# Patient Record
Sex: MALE | Race: WHITE | NOT HISPANIC OR LATINO | Employment: OTHER | URBAN - METROPOLITAN AREA
[De-identification: names, ages, dates, MRNs, and addresses within clinical notes are randomized per-mention and may not be internally consistent; named-entity substitution may affect disease eponyms.]

---

## 2020-06-18 ENCOUNTER — PREP FOR SURGERY (OUTPATIENT)
Dept: OTHER | Facility: HOSPITAL | Age: 70
End: 2020-06-18

## 2020-06-18 DIAGNOSIS — M16.11 PRIMARY OSTEOARTHRITIS OF RIGHT HIP: Primary | ICD-10-CM

## 2020-06-18 RX ORDER — OXYCODONE HCL 10 MG/1
10 TABLET, FILM COATED, EXTENDED RELEASE ORAL ONCE
Status: CANCELLED | OUTPATIENT
Start: 2020-10-23 | End: 2020-06-18

## 2020-06-18 RX ORDER — ACETAMINOPHEN 500 MG
1000 TABLET ORAL ONCE
Status: CANCELLED | OUTPATIENT
Start: 2020-10-23 | End: 2020-06-18

## 2020-06-18 RX ORDER — CEFAZOLIN SODIUM 2 G/100ML
2 INJECTION, SOLUTION INTRAVENOUS ONCE
Status: CANCELLED | OUTPATIENT
Start: 2020-10-23 | End: 2020-06-18

## 2020-06-19 NOTE — H&P
DORON MULLEN is a 69 year old male whose main reason for today's visit is pain in the right hip which has been present for 10 months.    He has been referred to me by Dr. Shady East.  He has been having progressively worsening pain.  He has some good days and bad days.  The patient denies an injury.    The patient is on pain meds(MELOXICAM TABLET (MELOXICAM TABS)).  This does give him good relief at times.   The patient states that he is having a sharp pain which he rates at a 8 on a scale from 1-10.  The pain occurs intermittently.  The pain is located in the front of the hip.  The pain occurs on the right side only.  The pain is not radiating.  The patient states that activity and walking makes it worse.   His main difficulty is after a long walk.  He is able to walk up to 2 miles.  He walks with his dog and also his wife.  I mean back from the walk.  He notices more pain.  He also notices progressive difficulty trying to put his shoes and socks on on the RIGHT side and also difficulty at times, getting in and out of the car.  He is very active and likes to swim.  He does not have any difficulty while swimming.  He likes to ski and he noticed some discomfort and pain while trying to do it.  He has also tried some PRP injections and hyaluronic acid in the RIGHT hip.  He did not notice any significant change in symptoms with those injections.  He denies any history of MRSA, DVT.  He denies any cardiac symptoms.  He has a previous history of ruptured diverticulum and sigmoid colon resection.  Review of Systems:  Positive for: Joint Pain.    Patient denies: Abdominal Pain, Bleeding, Chest Pain, Convulsions/Seizure, Decreased Motion, Depression, Difficulty Swallowing, Easy Bruisability, Emotional Disturbances, Eyes or Vision Problems, Fecal Incontinence, Fever/Chills, Headaches, Increased Thirst, Increased Hunger, Insomnia, Nausea/Vomiting, Night Sweats, Poor Balance, Persistent Cough, Rash, Shortness of Breath,  Shortness of Breath While Lying down, Skin Problems, Urinary Retention and Weakness.  Allergies:  Penicillin (critical)  Medications:  meloxicam tablet (meloxicam tabs)   atorvastatin calcium tablet (atorvastatin calcium tabs)   lisinopril tablet (lisinopril tabs)   Patient History of:  PROSTATE  HIGH CHOLESTEROL  BLOOD CLOTS/EMBOLISM - NEGATIVE  HYPERTENSION  CANCER - PROSTATE  Surgical History:  Negative  Known Family History of:  kidney disease-father  Social History:  SEBAS TORRES is a  69 year old male.  He has never used tobacco products.  He has not fallen in the last 12 months.      Past medical, social, family histories and ROS reviewed today with the patient and changes documented in the chart (06/17/2020).  Referring Dr. COBY JARQUIN MD  PCP Dr. RUTH BACON, [facility]    Physical Exam  Height:  67 in.    Weight:  170 lbs.     BMI:  26.72      Gait: antalgic               Ambulation: patient ambulates without assistive devices      Mental/HEENT/Cardio/Skin  The patient's general appearance was well nourished, well hydrated, no acute distress.  Orientation was alert and oritented x 3.  The patient's mood was normal.  A head exam revealed normocephalic/atraumatic.  An eye exam revealed pupils equal.  Pulmonary exam shows normal air exchange, no labored breathing, or shortness of breath.  A skin exam showed normal temperature and color in the area of examination.      Right Hip/Pelvis  Normal:  Neurovascular status is intact.  Sensation in hip is normal.  DP Pulse is 3+.  PT PULSE is 3+.  Capillary Refill is normal.  Reflexes are normal.    ROM  Internal Rotation: <30  External Rotation: <40  Abduction: <45  Adduction: <15  Flexion: <100  Extension: <5    Tenderness  Location: groin  Radiation of Pain: anterior thigh  Pain w/ Palpation: none  Tomás Test: negative  Impingement: negative  Straight Leg Raise: negative    Strength  Quad: normal  Abduction: normal  Adduction: normal  Flexion:  normal  Extension: normal  positive Stinchfield sign.  positive Trendelenburg sign.  Attempted movements of the   hip are painful and restricted    Left Hip/Pelvis          Imaging/Diagnostic Studies  X-rays of the Right Hip [AP;AP pelvis;Frog Lateral] were ordered and reviewed today.      X-rays of the right hip/pelvisX-rays show Acetabular femoral head osteophytes.  osteoarthritis is severe.  Images show bone on bone arthrosis.    Impression  Right hip primary osteoarthritis (HKG20-G15.11)    Plan  Options and alternatives were discussed in detail with the patient.  The patient has reached the point of disability and failed nonoperative management.   The patient is indicated for a RIGHT  total hip replacement .     The likely Risks and benefits of the procedure including but not limited to infection, DVT, pulmonary embolism,  recurrent dislocation, Leg length discrepancy, periprosthetic fractures, possibility of injury to nerves or vessels, tendons, possibility of morbidity and mortality likely  medical risks for stroke and heart attack,  have been discussed in detail.     Despite the risks involved the patient would like to proceed.  The patient  is being scheduled for a RIGHT  Total HIP  arthroplasty  by direct anterior approach   at [facility], [place], operated by [facility] on OCT 23 of 2020.  I will request for Medical and cardiac clearance from Dr. Shavon Merrill MD and   Cardiology.  Postoperative DVT prophylaxis - Patient has no high risk factors  Plan for Aspirin.   Preoperative antibiotic prophylaxis - Plan for SCIP protocol with Cephazolin weight based.   Patient does not use tobacco.      We discussed the benefits of surgical intervention, as well as alternative treatments.  Potential surgical risks and complications include but are not limited to DVT, infection, neurovascular injury, fracture, implant wear, failure, possible need for revision surgery, loss of motion, dislocation, limb length changes.  Sufficient  opportunity was given to discuss the condition and treatment plan with the doctor, and all questions were answered for the patient.  Nonsurgical measures such as injections, medications, or physical therapy may not offer significant relief to this patient.  The discussion lasted 30 minutes.  CHRISTIAN agreed to proceed with the surgery.      Christian should follow up with JAN BECKMAN MD post op.

## 2020-10-14 ENCOUNTER — TRANSCRIBE ORDERS (OUTPATIENT)
Dept: PREADMISSION TESTING | Facility: HOSPITAL | Age: 70
End: 2020-10-14

## 2020-10-14 DIAGNOSIS — Z01.818 OTHER SPECIFIED PRE-OPERATIVE EXAMINATION: Primary | ICD-10-CM

## 2020-10-16 ENCOUNTER — TRANSCRIBE ORDERS (OUTPATIENT)
Dept: LAB | Facility: HOSPITAL | Age: 70
End: 2020-10-16

## 2020-10-16 ENCOUNTER — LAB (OUTPATIENT)
Dept: LAB | Facility: HOSPITAL | Age: 70
End: 2020-10-16

## 2020-10-16 ENCOUNTER — APPOINTMENT (OUTPATIENT)
Dept: PREADMISSION TESTING | Facility: HOSPITAL | Age: 70
End: 2020-10-16

## 2020-10-16 ENCOUNTER — HOSPITAL ENCOUNTER (OUTPATIENT)
Dept: GENERAL RADIOLOGY | Facility: HOSPITAL | Age: 70
Discharge: HOME | End: 2020-10-16

## 2020-10-16 VITALS
HEART RATE: 63 BPM | OXYGEN SATURATION: 99 % | TEMPERATURE: 98 F | SYSTOLIC BLOOD PRESSURE: 144 MMHG | HEIGHT: 70 IN | RESPIRATION RATE: 16 BRPM | DIASTOLIC BLOOD PRESSURE: 85 MMHG | WEIGHT: 176 LBS | BODY MASS INDEX: 25.2 KG/M2

## 2020-10-16 DIAGNOSIS — I10 ESSENTIAL HYPERTENSION, MALIGNANT: ICD-10-CM

## 2020-10-16 DIAGNOSIS — M16.11 PRIMARY OSTEOARTHRITIS OF RIGHT HIP: ICD-10-CM

## 2020-10-16 DIAGNOSIS — E78.5 HYPERLIPIDEMIA, UNSPECIFIED HYPERLIPIDEMIA TYPE: Primary | ICD-10-CM

## 2020-10-16 DIAGNOSIS — Z85.46 PERSONAL HISTORY OF MALIGNANT NEOPLASM OF PROSTATE: ICD-10-CM

## 2020-10-16 DIAGNOSIS — E78.5 HYPERLIPIDEMIA, UNSPECIFIED HYPERLIPIDEMIA TYPE: ICD-10-CM

## 2020-10-16 LAB
ALBUMIN SERPL-MCNC: 4.3 G/DL (ref 3.5–5.2)
ALBUMIN/GLOB SERPL: 1.5 G/DL
ALP SERPL-CCNC: 58 U/L (ref 39–117)
ALT SERPL W P-5'-P-CCNC: 22 U/L (ref 1–41)
ANION GAP SERPL CALCULATED.3IONS-SCNC: 6.6 MMOL/L (ref 5–15)
APTT PPP: 30.8 SECONDS (ref 22.7–35.4)
AST SERPL-CCNC: 19 U/L (ref 1–40)
BASOPHILS # BLD AUTO: 0.04 10*3/MM3 (ref 0–0.2)
BASOPHILS NFR BLD AUTO: 0.6 % (ref 0–1.5)
BILIRUB SERPL-MCNC: 0.6 MG/DL (ref 0–1.2)
BUN SERPL-MCNC: 26 MG/DL (ref 8–23)
BUN/CREAT SERPL: 21.8 (ref 7–25)
CALCIUM SPEC-SCNC: 9.9 MG/DL (ref 8.6–10.5)
CHLORIDE SERPL-SCNC: 103 MMOL/L (ref 98–107)
CHOLEST SERPL-MCNC: 161 MG/DL (ref 0–200)
CK SERPL-CCNC: 65 U/L (ref 20–200)
CO2 SERPL-SCNC: 23.4 MMOL/L (ref 22–29)
CREAT SERPL-MCNC: 1.19 MG/DL (ref 0.76–1.27)
DEPRECATED RDW RBC AUTO: 41.7 FL (ref 37–54)
EOSINOPHIL # BLD AUTO: 0.14 10*3/MM3 (ref 0–0.4)
EOSINOPHIL NFR BLD AUTO: 2.2 % (ref 0.3–6.2)
ERYTHROCYTE [DISTWIDTH] IN BLOOD BY AUTOMATED COUNT: 12.7 % (ref 12.3–15.4)
GFR SERPL CREATININE-BSD FRML MDRD: 60 ML/MIN/1.73
GLOBULIN UR ELPH-MCNC: 2.9 GM/DL
GLUCOSE SERPL-MCNC: 102 MG/DL (ref 65–99)
HBA1C MFR BLD: 5.79 % (ref 4.8–5.6)
HCT VFR BLD AUTO: 47.2 % (ref 37.5–51)
HDLC SERPL-MCNC: 43 MG/DL (ref 40–60)
HGB BLD-MCNC: 15.7 G/DL (ref 13–17.7)
IMM GRANULOCYTES # BLD AUTO: 0.04 10*3/MM3 (ref 0–0.05)
IMM GRANULOCYTES NFR BLD AUTO: 0.6 % (ref 0–0.5)
INR PPP: 1.07 (ref 0.9–1.1)
LDLC SERPL CALC-MCNC: 99 MG/DL (ref 0–100)
LDLC/HDLC SERPL: 2.27 {RATIO}
LYMPHOCYTES # BLD AUTO: 1.58 10*3/MM3 (ref 0.7–3.1)
LYMPHOCYTES NFR BLD AUTO: 24.8 % (ref 19.6–45.3)
MCH RBC QN AUTO: 30 PG (ref 26.6–33)
MCHC RBC AUTO-ENTMCNC: 33.3 G/DL (ref 31.5–35.7)
MCV RBC AUTO: 90.2 FL (ref 79–97)
MONOCYTES # BLD AUTO: 0.61 10*3/MM3 (ref 0.1–0.9)
MONOCYTES NFR BLD AUTO: 9.6 % (ref 5–12)
NEUTROPHILS NFR BLD AUTO: 3.97 10*3/MM3 (ref 1.7–7)
NEUTROPHILS NFR BLD AUTO: 62.2 % (ref 42.7–76)
NRBC BLD AUTO-RTO: 0 /100 WBC (ref 0–0.2)
PLATELET # BLD AUTO: 268 10*3/MM3 (ref 140–450)
PMV BLD AUTO: 10 FL (ref 6–12)
POTASSIUM SERPL-SCNC: 4.8 MMOL/L (ref 3.5–5.2)
PROT SERPL-MCNC: 7.2 G/DL (ref 6–8.5)
PROTHROMBIN TIME: 13.8 SECONDS (ref 11.7–14.2)
PSA SERPL-MCNC: <0.014 NG/ML (ref 0–4)
RBC # BLD AUTO: 5.23 10*6/MM3 (ref 4.14–5.8)
SODIUM SERPL-SCNC: 133 MMOL/L (ref 136–145)
TRIGL SERPL-MCNC: 103 MG/DL (ref 0–150)
VLDLC SERPL-MCNC: 19 MG/DL (ref 5–40)
WBC # BLD AUTO: 6.38 10*3/MM3 (ref 3.4–10.8)

## 2020-10-16 PROCEDURE — 71046 X-RAY EXAM CHEST 2 VIEWS: CPT

## 2020-10-16 PROCEDURE — 85025 COMPLETE CBC W/AUTO DIFF WBC: CPT | Performed by: ORTHOPAEDIC SURGERY

## 2020-10-16 PROCEDURE — 36415 COLL VENOUS BLD VENIPUNCTURE: CPT

## 2020-10-16 PROCEDURE — 73502 X-RAY EXAM HIP UNI 2-3 VIEWS: CPT

## 2020-10-16 PROCEDURE — 80061 LIPID PANEL: CPT

## 2020-10-16 PROCEDURE — 63710000001 MUPIROCIN 2 % OINTMENT: Performed by: ORTHOPAEDIC SURGERY

## 2020-10-16 PROCEDURE — 82550 ASSAY OF CK (CPK): CPT

## 2020-10-16 PROCEDURE — A9270 NON-COVERED ITEM OR SERVICE: HCPCS | Performed by: ORTHOPAEDIC SURGERY

## 2020-10-16 PROCEDURE — 83036 HEMOGLOBIN GLYCOSYLATED A1C: CPT | Performed by: ORTHOPAEDIC SURGERY

## 2020-10-16 PROCEDURE — 84153 ASSAY OF PSA TOTAL: CPT

## 2020-10-16 PROCEDURE — 85610 PROTHROMBIN TIME: CPT | Performed by: ORTHOPAEDIC SURGERY

## 2020-10-16 PROCEDURE — 85730 THROMBOPLASTIN TIME PARTIAL: CPT | Performed by: ORTHOPAEDIC SURGERY

## 2020-10-16 PROCEDURE — 80053 COMPREHEN METABOLIC PANEL: CPT | Performed by: ORTHOPAEDIC SURGERY

## 2020-10-16 RX ORDER — ACETAMINOPHEN 500 MG
1000 TABLET ORAL EVERY 6 HOURS PRN
COMMUNITY

## 2020-10-16 RX ORDER — LORATADINE 10 MG/1
10 TABLET ORAL DAILY
COMMUNITY

## 2020-10-16 RX ORDER — LISINOPRIL 20 MG/1
20 TABLET ORAL 2 TIMES DAILY
COMMUNITY

## 2020-10-16 RX ORDER — MELOXICAM 15 MG/1
15 TABLET ORAL DAILY
COMMUNITY

## 2020-10-16 RX ORDER — ATORVASTATIN CALCIUM 10 MG/1
10 TABLET, FILM COATED ORAL DAILY
COMMUNITY

## 2020-10-16 NOTE — DISCHARGE INSTRUCTIONS
Take the following medications the morning of surgery:      TYLENOL, LORATIDINE    ARRIVE TO MAIN SURGERY AT 5:15 AM ON 10/23/2020    If you are on prescription narcotic pain medication to control your pain you may also take that medication the morning of surgery.    General Instructions:  • Do not eat solid food after midnight the night before surgery.  • You may drink clear liquids day of surgery but must stop at least one hour before your hospital arrival time.  • It is beneficial for you to have a clear drink that contains carbohydrates the day of surgery.  We suggest a 12 to 20 ounce bottle of Gatorade or Powerade for non-diabetic patients or a 12 to 20 ounce bottle of G2 or Powerade Zero for diabetic patients. (Pediatric patients, are not advised to drink a 12 to 20 ounce carbohydrate drink)    Clear liquids are liquids you can see through.  Nothing red in color.     Plain water                               Sports drinks  Sodas                                   Gelatin (Jell-O)  Fruit juices without pulp such as white grape juice and apple juice  Popsicles that contain no fruit or yogurt  Tea or coffee (no cream or milk added)  Gatorade / Powerade  G2 / Powerade Zero    • Infants may have breast milk up to four hours before surgery.  • Infants drinking formula may drink formula up to six hours before surgery.   • Patients who avoid smoking, chewing tobacco and alcohol for 4 weeks prior to surgery have a reduced risk of post-operative complications.  Quit smoking as many days before surgery as you can.  • Do not smoke, use chewing tobacco or drink alcohol the day of surgery.   • If applicable bring your C-PAP/ BI-PAP machine.  • Bring any papers given to you in the doctor’s office.  • Wear clean comfortable clothes.  • Do not wear contact lenses, false eyelashes or make-up.  Bring a case for your glasses.   • Bring crutches or walker if applicable.  • Remove all piercings.  Leave jewelry and any other  valuables at home.  • Hair extensions with metal clips must be removed prior to surgery.  • The Pre-Admission Testing nurse will instruct you to bring medications if unable to obtain an accurate list in Pre-Admission Testing.        If you were given a blood bank ID arm band remember to bring it with you the day of surgery.    Preventing a Surgical Site Infection:  • For 2 to 3 days before surgery, avoid shaving with a razor because the razor can irritate skin and make it easier to develop an infection.    • Any areas of open skin can increase the risk of a post-operative wound infection by allowing bacteria to enter and travel throughout the body.  Notify your surgeon if you have any skin wounds / rashes even if it is not near the expected surgical site.  The area will need assessed to determine if surgery should be delayed until it is healed.  • The night prior to surgery shower using a fresh bar of anti-bacterial soap (such as Dial) and clean washcloth.  Sleep in a clean bed with clean clothing.  Do not allow pets to sleep with you.  • Shower on the morning of surgery using a fresh bar of anti-bacterial soap (such as Dial) and clean washcloth.  Dry with a clean towel and dress in clean clothing.  • Ask your surgeon if you will be receiving antibiotics prior to surgery.  • Make sure you, your family, and all healthcare providers clean their hands with soap and water or an alcohol based hand  before caring for you or your wound.    Day of surgery:  Your arrival time is approximately two hours before your scheduled surgery time.  Upon arrival, a Pre-op nurse and Anesthesiologist will review your health history, obtain vital signs, and answer questions you may have.  The only belongings needed at this time will be a list of your home medications and if applicable your C-PAP/BI-PAP machine.  If you are staying overnight your family can leave the rest of your belongings in the car and bring them to your room  later.  A Pre-op nurse will start an IV and you may receive medication in preparation for surgery, including something to help you relax.  While you are in surgery your family should notify the waiting room  if they leave the waiting room area and provide a contact phone number.    Please be aware that surgery does come with discomfort.  We want to make every effort to control your discomfort so please discuss any uncontrolled symptoms with your nurse.   Your doctor will most likely have prescribed pain medications.      If you are going home after surgery you will receive individualized written care instructions before being discharged.  A responsible adult must drive you to and from the hospital on the day of your surgery and stay with you for 24 hours.    If you are staying overnight following surgery, you will be transported to your hospital room following the recovery period.  [facility] has all private rooms.    If you have any questions please call Pre-Admission Testing at [phone].  Deductibles and co-payments are collected on the day of service. Please be prepared to pay the required co-pay, deductible or deposit on the day of service as defined by your plan.    Patient Education for Self-Quarantine Process    Following your COVID testing, we strongly recommend that you do not leave your home after you have been tested for COVID except to get medical care. This includes not going to work, school or to public areas.  If this is not possible for you to do please limit your activities to only required outings.  Be sure to wear a mask when you are with other people, practice social distancing and wash your hands frequently.      The following items provide additional details to keep you safe.  • Wash your hands with soap and water frequently for at least 20 seconds.   • Avoid touching your eyes, nose and mouth with unwashed hands.  • Do not share anything - utensils, towels, food  from the same bowl.   • Have your own utensils, drinking glass, dishes, towels and bedding.   • Do not have visitors.   • Do use FaceTime to stay in touch with family and friends.  • You should stay in a specific room away from others if possible.   • Stay at least 6 feet away from others in the home if you cannot have a dedicated room to yourself.   • Do not snuggle with your pet. While the CDC says there is no evidence that pets can spread COVID-19 or be infected from humans, it is probably best to avoid “petting, snuggling, being kissed or licked and sharing food (during self-quarantine)”, according to the CDC.   • Sanitize household surfaces daily. Include all high touch areas (door handles, light switches, phones, countertops, etc.)  • Do not share a bathroom with others, if possible.   • Wear a mask around others in your home if you are unable to stay in a separate room or 6 feet apart. If  you are unable to wear a mask, have your family member wear a mask if they must be within 6 feet of you.   Call your surgeon immediately if you experience any of the following symptoms:  • Sore Throat  • Shortness of Breath or difficulty breathing  • Cough  • Chills  • Body soreness or muscle pain  • Headache  • Fever  • New loss of taste or smell  • Do not arrive for your surgery ill.  Your procedure will need to be rescheduled to another time.  You will need to call your physician before the day of surgery to avoid any unnecessary exposure to hospital staff as well as other patients.    CHLORHEXIDINE CLOTH INSTRUCTIONS  The morning of surgery follow these instructions using the Chlorhexidine cloths you've been given.  These steps reduce bacteria on the body.  Do not use the cloths near your eyes, ears mouth, genitalia or on open wounds.  Throw the cloths away after use but do not try to flush them down a toilet.      • Open and remove one cloth at a time from the package.    • Leave the cloth unfolded and begin the  bathing.  • Massage the skin with the cloths using gentle pressure to remove bacteria.  Do not scrub harshly.   • Follow the steps below with one 2% CHG cloth per area (6 total cloths).  • One cloth for neck, shoulders and chest.  • One cloth for both arms, hands, fingers and underarms (do underarms last).  • One cloth for the abdomen followed by groin.  • One cloth for right leg and foot including between the toes.  • One cloth for left leg and foot including between the toes.  • The last cloth is to be used for the back of the neck, back and buttocks.    Allow the CHG to air dry 3 minutes on the skin which will give it time to work and decrease the chance of irritation.  The skin may feel sticky until it is dry.  Do not rinse with water or any other liquid or you will lose the beneficial effects of the CHG.  If mild skin irritation occurs, do rinse the skin to remove the CHG.  Report this to the nurse at time of admission.  Do not apply lotions, creams, ointments, deodorants or perfumes after using the clothes. Dress in clean clothes before coming to the hospital.    BACTROBAN NASAL OINTMENT  There are many germs normally in your nose. Bactroban is an ointment that will help reduce these germs. Please follow these instructions for Bactroban use:      _1___The day before surgery in the morning  Date__10/22/2020______    _2___The day before surgery in the evening              Date__10/22/2020______    __3__The day of surgery in the morning    Date__10/23/2020______    **Squirt ½ package of Bactroban Ointment onto a cotton applicator and apply to inside of 1st nostril.  Squirt the remaining Bactroban and apply to the inside of the other nostril.

## 2020-10-20 NOTE — H&P
DORON MULLEN is a 69 year old male whose main reason for today's visit is pain in the right hip which has been present for 10 months.    He has been referred to me by Dr. Shady East.  He has been having progressively worsening pain.  He has some good days and bad days.  The patient denies an injury.    The patient is on pain meds(MELOXICAM TABLET (MELOXICAM TABS)).  This does give him good relief at times.   The patient states that he is having a sharp pain which he rates at a 8 on a scale from 1-10.  The pain occurs intermittently.  The pain is located in the front of the hip.  The pain occurs on the right side only.  The pain is not radiating.  The patient states that activity and walking makes it worse.   His main difficulty is after a long walk.  He is able to walk up to 2 miles.  He walks with his dog and also his wife.  I mean back from the walk.  He notices more pain.  He also notices progressive difficulty trying to put his shoes and socks on on the RIGHT side and also difficulty at times, getting in and out of the car.  He is very active and likes to swim.  He does not have any difficulty while swimming.  He likes to ski and he noticed some discomfort and pain while trying to do it.  He has also tried some PRP injections and hyaluronic acid in the RIGHT hip.  He did not notice any significant change in symptoms with those injections.  He denies any history of MRSA, DVT.  He denies any cardiac symptoms.  He has a previous history of ruptured diverticulum and sigmoid colon resection.  Review of Systems:  Positive for: Joint Pain.     Patient denies: Abdominal Pain, Bleeding, Chest Pain, Convulsions/Seizure, Decreased Motion, Depression, Difficulty Swallowing, Easy Bruisability, Emotional Disturbances, Eyes or Vision Problems, Fecal Incontinence, Fever/Chills, Headaches, Increased Thirst, Increased Hunger, Insomnia, Nausea/Vomiting, Night Sweats, Poor Balance, Persistent Cough, Rash, Shortness of Breath,  Shortness of Breath While Lying down, Skin Problems, Urinary Retention and Weakness.  Allergies:  Penicillin (critical)  Medications:  meloxicam tablet (meloxicam tabs)   atorvastatin calcium tablet (atorvastatin calcium tabs)   lisinopril tablet (lisinopril tabs)   Patient History of:  PROSTATE  HIGH CHOLESTEROL  BLOOD CLOTS/EMBOLISM - NEGATIVE  HYPERTENSION  CANCER - PROSTATE  Surgical History:  Negative  Known Family History of:  kidney disease-father  Social History:  SEBAS TORRES is a  69 year old male.  He has never used tobacco products.  He has not fallen in the last 12 months.       Past medical, social, family histories and ROS reviewed today with the patient and changes documented in the chart (06/17/2020).  Referring Dr. COBY JARQUIN MD  PCP Dr. RUTH BACON, [facility]     Physical Exam  Height:  67 in.    Weight:  170 lbs.     BMI:  26.72       Gait: antalgic               Ambulation: patient ambulates without assistive devices        Mental/HEENT/Cardio/Skin  The patient's general appearance was well nourished, well hydrated, no acute distress.  Orientation was alert and oritented x 3.  The patient's mood was normal.  A head exam revealed normocephalic/atraumatic.  An eye exam revealed pupils equal.  Pulmonary exam shows normal air exchange, no labored breathing, or shortness of breath.  A skin exam showed normal temperature and color in the area of examination.       Right Hip/Pelvis  Normal:  Neurovascular status is intact.  Sensation in hip is normal.  DP Pulse is 3+.  PT PULSE is 3+.  Capillary Refill is normal.  Reflexes are normal.    ROM  Internal Rotation: <30  External Rotation: <40  Abduction: <45  Adduction: <15  Flexion: <100  Extension: <5     Tenderness  Location: groin  Radiation of Pain: anterior thigh  Pain w/ Palpation: none  Tomás Test: negative  Impingement: negative  Straight Leg Raise: negative     Strength  Quad: normal  Abduction: normal  Adduction: normal  Flexion:  normal  Extension: normal  positive Stinchfield sign.  positive Trendelenburg sign.  Attempted movements of the   hip are painful and restricted     Left Hip/Pelvis            Imaging/Diagnostic Studies  X-rays of the Right Hip [AP;AP pelvis;Frog Lateral] were ordered and reviewed today.       X-rays of the right hip/pelvisX-rays show Acetabular femoral head osteophytes.  osteoarthritis is severe.  Images show bone on bone arthrosis.    Impression  Right hip primary osteoarthritis ([id number]-Y04.11)     Plan  Options and alternatives were discussed in detail with the patient.  The patient has reached the point of disability and failed nonoperative management.   The patient is indicated for a RIGHT  total hip replacement .     The likely Risks and benefits of the procedure including but not limited to infection, DVT, pulmonary embolism,  recurrent dislocation, Leg length discrepancy, periprosthetic fractures, possibility of injury to nerves or vessels, tendons, possibility of morbidity and mortality likely  medical risks for stroke and heart attack,  have been discussed in detail.     Despite the risks involved the patient would like to proceed.  The patient  is being scheduled for a RIGHT  Total HIP  arthroplasty  by direct anterior approach   at [facility] on OCT 23 of 2020.  I will request for Medical and cardiac clearance from Dr. Shavon Merrill MD and   Cardiology.  Postoperative DVT prophylaxis - Patient has no high risk factors  Plan for Aspirin.   Preoperative antibiotic prophylaxis - Plan for SCIP protocol with Cephazolin weight based.   Patient does not use tobacco.       We discussed the benefits of surgical intervention, as well as alternative treatments.  Potential surgical risks and complications include but are not limited to DVT, infection, neurovascular injury, fracture, implant wear, failure, possible need for revision surgery, loss of motion, dislocation, limb length changes.  Sufficient  opportunity was given to discuss the condition and treatment plan with the doctor, and all questions were answered for the patient.  Nonsurgical measures such as injections, medications, or physical therapy may not offer significant relief to this patient.  The discussion lasted 30 minutes.  CHRISTIAN agreed to proceed with the surgery.       Christian should follow up with JAN BECKMAN MD post op.

## 2020-10-21 ENCOUNTER — LAB (OUTPATIENT)
Dept: LAB | Facility: HOSPITAL | Age: 70
End: 2020-10-21

## 2020-10-21 DIAGNOSIS — Z01.818 OTHER SPECIFIED PRE-OPERATIVE EXAMINATION: ICD-10-CM

## 2020-10-21 PROCEDURE — U0004 COV-19 TEST NON-CDC HGH THRU: HCPCS | Performed by: INTERNAL MEDICINE

## 2020-10-21 PROCEDURE — C9803 HOPD COVID-19 SPEC COLLECT: HCPCS

## 2020-10-22 LAB — SARS-COV-2 RNA RESP QL NAA+PROBE: NOT DETECTED

## 2020-10-23 ENCOUNTER — APPOINTMENT (OUTPATIENT)
Dept: GENERAL RADIOLOGY | Facility: HOSPITAL | Age: 70
End: 2020-10-23

## 2020-10-23 ENCOUNTER — ANESTHESIA (OUTPATIENT)
Dept: PERIOP | Facility: HOSPITAL | Age: 70
End: 2020-10-23

## 2020-10-23 ENCOUNTER — ANESTHESIA EVENT (OUTPATIENT)
Dept: PERIOP | Facility: HOSPITAL | Age: 70
End: 2020-10-23

## 2020-10-23 ENCOUNTER — HOSPITAL ENCOUNTER (INPATIENT)
Facility: HOSPITAL | Age: 70
LOS: 1 days | Discharge: HOME HEALTH | End: 2020-10-24
Attending: ORTHOPAEDIC SURGERY | Admitting: ORTHOPAEDIC SURGERY

## 2020-10-23 DIAGNOSIS — M16.11 PRIMARY OSTEOARTHRITIS OF RIGHT HIP: Primary | ICD-10-CM

## 2020-10-23 DIAGNOSIS — Z96.641 STATUS POST RIGHT HIP REPLACEMENT: ICD-10-CM

## 2020-10-23 PROCEDURE — 25010000002 ROPIVACAINE PER 1 MG: Performed by: ORTHOPAEDIC SURGERY

## 2020-10-23 PROCEDURE — 25010000002 CLONIDINE PER 1 MG: Performed by: ORTHOPAEDIC SURGERY

## 2020-10-23 PROCEDURE — 25010000003 CEFAZOLIN IN DEXTROSE 2-4 GM/100ML-% SOLUTION: Performed by: ORTHOPAEDIC SURGERY

## 2020-10-23 PROCEDURE — 25010000002 PHENYLEPHRINE PER 1 ML: Performed by: NURSE ANESTHETIST, CERTIFIED REGISTERED

## 2020-10-23 PROCEDURE — C1776 JOINT DEVICE (IMPLANTABLE): HCPCS | Performed by: ORTHOPAEDIC SURGERY

## 2020-10-23 PROCEDURE — 25010000002 FENTANYL CITRATE (PF) 100 MCG/2ML SOLUTION: Performed by: NURSE ANESTHETIST, CERTIFIED REGISTERED

## 2020-10-23 PROCEDURE — 73501 X-RAY EXAM HIP UNI 1 VIEW: CPT

## 2020-10-23 PROCEDURE — 25010000002 HYDROMORPHONE PER 4 MG: Performed by: NURSE ANESTHETIST, CERTIFIED REGISTERED

## 2020-10-23 PROCEDURE — 25010000002 ONDANSETRON PER 1 MG: Performed by: NURSE ANESTHETIST, CERTIFIED REGISTERED

## 2020-10-23 PROCEDURE — 0SR904A REPLACEMENT OF RIGHT HIP JOINT WITH CERAMIC ON POLYETHYLENE SYNTHETIC SUBSTITUTE, UNCEMENTED, OPEN APPROACH: ICD-10-PCS | Performed by: ORTHOPAEDIC SURGERY

## 2020-10-23 PROCEDURE — 25010000002 NEOSTIGMINE PER 0.5 MG: Performed by: NURSE ANESTHETIST, CERTIFIED REGISTERED

## 2020-10-23 PROCEDURE — 25010000002 KETOROLAC TROMETHAMINE PER 15 MG: Performed by: ORTHOPAEDIC SURGERY

## 2020-10-23 PROCEDURE — 76000 FLUOROSCOPY <1 HR PHYS/QHP: CPT

## 2020-10-23 PROCEDURE — 25010000002 PROPOFOL 10 MG/ML EMULSION: Performed by: NURSE ANESTHETIST, CERTIFIED REGISTERED

## 2020-10-23 PROCEDURE — 25010000002 MIDAZOLAM PER 1 MG: Performed by: ANESTHESIOLOGY

## 2020-10-23 PROCEDURE — 97110 THERAPEUTIC EXERCISES: CPT

## 2020-10-23 PROCEDURE — 25010000002 DEXAMETHASONE PER 1 MG: Performed by: NURSE ANESTHETIST, CERTIFIED REGISTERED

## 2020-10-23 PROCEDURE — 97161 PT EVAL LOW COMPLEX 20 MIN: CPT

## 2020-10-23 DEVICE — ACTIS DUOFIX HIP PROSTHESIS (FEMORAL STEM 12/14 TAPER CEMENTLESS SIZE 8 HIGH COLLAR)  CE
Type: IMPLANTABLE DEVICE | Site: HIP | Status: FUNCTIONAL
Brand: ACTIS

## 2020-10-23 DEVICE — PINNACLE POROCOAT ACETABULAR SHELL SECTOR II 52MM OD
Type: IMPLANTABLE DEVICE | Site: HIP | Status: FUNCTIONAL
Brand: PINNACLE POROCOAT

## 2020-10-23 DEVICE — BIOLOX DELTA CERAMIC FEMORAL HEAD +1.5 36MM DIA 12/14 TAPER
Type: IMPLANTABLE DEVICE | Site: HIP | Status: FUNCTIONAL
Brand: BIOLOX DELTA

## 2020-10-23 DEVICE — KNOTLESS TISSUE CONTROL DEVICE, VIOLET UNIDIRECTIONAL (ANTIBACTERIAL) SYNTHETIC ABSORBABLE DEVICE
Type: IMPLANTABLE DEVICE | Site: HIP | Status: FUNCTIONAL
Brand: STRATAFIX

## 2020-10-23 DEVICE — PINNACLE HIP SOLUTIONS ALTRX POLYETHYLENE ACETABULAR LINER NEUTRAL 36MM ID 52MM OD
Type: IMPLANTABLE DEVICE | Site: HIP | Status: FUNCTIONAL
Brand: PINNACLE ALTRX

## 2020-10-23 DEVICE — TOTL HIP COP DEPUY 9641334: Type: IMPLANTABLE DEVICE | Site: HIP | Status: FUNCTIONAL

## 2020-10-23 DEVICE — SUT FW #2 W/TPR NDL 1/2 CIR 38IN 97CM 26.5MM BLU: Type: IMPLANTABLE DEVICE | Site: HIP | Status: FUNCTIONAL

## 2020-10-23 RX ORDER — UREA 10 %
1 LOTION (ML) TOPICAL NIGHTLY PRN
Status: DISCONTINUED | OUTPATIENT
Start: 2020-10-23 | End: 2020-10-24 | Stop reason: HOSPADM

## 2020-10-23 RX ORDER — ONDANSETRON 4 MG/1
4 TABLET, FILM COATED ORAL EVERY 6 HOURS PRN
Status: DISCONTINUED | OUTPATIENT
Start: 2020-10-23 | End: 2020-10-24 | Stop reason: HOSPADM

## 2020-10-23 RX ORDER — KETAMINE HYDROCHLORIDE 10 MG/ML
INJECTION INTRAMUSCULAR; INTRAVENOUS AS NEEDED
Status: DISCONTINUED | OUTPATIENT
Start: 2020-10-23 | End: 2020-10-23 | Stop reason: SURG

## 2020-10-23 RX ORDER — CEFAZOLIN SODIUM 2 G/100ML
2 INJECTION, SOLUTION INTRAVENOUS EVERY 8 HOURS
Status: COMPLETED | OUTPATIENT
Start: 2020-10-23 | End: 2020-10-24

## 2020-10-23 RX ORDER — BISACODYL 5 MG/1
10 TABLET, DELAYED RELEASE ORAL DAILY PRN
Status: DISCONTINUED | OUTPATIENT
Start: 2020-10-24 | End: 2020-10-24 | Stop reason: HOSPADM

## 2020-10-23 RX ORDER — NALOXONE HCL 0.4 MG/ML
0.1 VIAL (ML) INJECTION
Status: DISCONTINUED | OUTPATIENT
Start: 2020-10-23 | End: 2020-10-24 | Stop reason: HOSPADM

## 2020-10-23 RX ORDER — FENTANYL CITRATE 50 UG/ML
50 INJECTION, SOLUTION INTRAMUSCULAR; INTRAVENOUS
Status: DISCONTINUED | OUTPATIENT
Start: 2020-10-23 | End: 2020-10-23 | Stop reason: HOSPADM

## 2020-10-23 RX ORDER — ONDANSETRON 2 MG/ML
4 INJECTION INTRAMUSCULAR; INTRAVENOUS ONCE AS NEEDED
Status: DISCONTINUED | OUTPATIENT
Start: 2020-10-23 | End: 2020-10-23 | Stop reason: HOSPADM

## 2020-10-23 RX ORDER — HYDROCODONE BITARTRATE AND ACETAMINOPHEN 7.5; 325 MG/1; MG/1
1 TABLET ORAL ONCE AS NEEDED
Status: DISCONTINUED | OUTPATIENT
Start: 2020-10-23 | End: 2020-10-23 | Stop reason: HOSPADM

## 2020-10-23 RX ORDER — ACETAMINOPHEN 500 MG
1000 TABLET ORAL EVERY 8 HOURS
Status: DISCONTINUED | OUTPATIENT
Start: 2020-10-23 | End: 2020-10-23

## 2020-10-23 RX ORDER — DOCUSATE SODIUM 100 MG/1
100 CAPSULE, LIQUID FILLED ORAL 2 TIMES DAILY
Status: DISCONTINUED | OUTPATIENT
Start: 2020-10-23 | End: 2020-10-24 | Stop reason: HOSPADM

## 2020-10-23 RX ORDER — PROPOFOL 10 MG/ML
VIAL (ML) INTRAVENOUS AS NEEDED
Status: DISCONTINUED | OUTPATIENT
Start: 2020-10-23 | End: 2020-10-23 | Stop reason: SURG

## 2020-10-23 RX ORDER — MELOXICAM 15 MG/1
15 TABLET ORAL DAILY
Status: DISCONTINUED | OUTPATIENT
Start: 2020-10-23 | End: 2020-10-23

## 2020-10-23 RX ORDER — SODIUM CHLORIDE 9 MG/ML
75 INJECTION, SOLUTION INTRAVENOUS CONTINUOUS
Status: DISCONTINUED | OUTPATIENT
Start: 2020-10-23 | End: 2020-10-24

## 2020-10-23 RX ORDER — LABETALOL HYDROCHLORIDE 5 MG/ML
5 INJECTION, SOLUTION INTRAVENOUS
Status: DISCONTINUED | OUTPATIENT
Start: 2020-10-23 | End: 2020-10-23 | Stop reason: HOSPADM

## 2020-10-23 RX ORDER — SODIUM CHLORIDE 0.9 % (FLUSH) 0.9 %
10 SYRINGE (ML) INJECTION AS NEEDED
Status: DISCONTINUED | OUTPATIENT
Start: 2020-10-23 | End: 2020-10-23 | Stop reason: HOSPADM

## 2020-10-23 RX ORDER — SODIUM CHLORIDE 0.9 % (FLUSH) 0.9 %
10 SYRINGE (ML) INJECTION EVERY 12 HOURS SCHEDULED
Status: DISCONTINUED | OUTPATIENT
Start: 2020-10-23 | End: 2020-10-23 | Stop reason: HOSPADM

## 2020-10-23 RX ORDER — ONDANSETRON 2 MG/ML
INJECTION INTRAMUSCULAR; INTRAVENOUS AS NEEDED
Status: DISCONTINUED | OUTPATIENT
Start: 2020-10-23 | End: 2020-10-23 | Stop reason: SURG

## 2020-10-23 RX ORDER — FAMOTIDINE 10 MG/ML
20 INJECTION, SOLUTION INTRAVENOUS ONCE
Status: COMPLETED | OUTPATIENT
Start: 2020-10-23 | End: 2020-10-23

## 2020-10-23 RX ORDER — ACETAMINOPHEN 500 MG
1000 TABLET ORAL ONCE
Status: COMPLETED | OUTPATIENT
Start: 2020-10-23 | End: 2020-10-23

## 2020-10-23 RX ORDER — PANTOPRAZOLE SODIUM 40 MG/1
40 TABLET, DELAYED RELEASE ORAL
Status: DISCONTINUED | OUTPATIENT
Start: 2020-10-24 | End: 2020-10-24 | Stop reason: HOSPADM

## 2020-10-23 RX ORDER — HYDROCODONE BITARTRATE AND ACETAMINOPHEN 7.5; 325 MG/1; MG/1
2 TABLET ORAL EVERY 4 HOURS PRN
Status: DISPENSED | OUTPATIENT
Start: 2020-10-23 | End: 2020-10-24

## 2020-10-23 RX ORDER — GLYCOPYRROLATE 0.2 MG/ML
INJECTION INTRAMUSCULAR; INTRAVENOUS AS NEEDED
Status: DISCONTINUED | OUTPATIENT
Start: 2020-10-23 | End: 2020-10-23 | Stop reason: SURG

## 2020-10-23 RX ORDER — MIDAZOLAM HYDROCHLORIDE 1 MG/ML
1 INJECTION INTRAMUSCULAR; INTRAVENOUS
Status: DISCONTINUED | OUTPATIENT
Start: 2020-10-23 | End: 2020-10-23 | Stop reason: HOSPADM

## 2020-10-23 RX ORDER — HYDROMORPHONE HCL 110MG/55ML
PATIENT CONTROLLED ANALGESIA SYRINGE INTRAVENOUS AS NEEDED
Status: DISCONTINUED | OUTPATIENT
Start: 2020-10-23 | End: 2020-10-23 | Stop reason: SURG

## 2020-10-23 RX ORDER — OXYCODONE HCL 10 MG/1
10 TABLET, FILM COATED, EXTENDED RELEASE ORAL ONCE
Status: COMPLETED | OUTPATIENT
Start: 2020-10-23 | End: 2020-10-23

## 2020-10-23 RX ORDER — NALOXONE HCL 0.4 MG/ML
0.2 VIAL (ML) INJECTION AS NEEDED
Status: DISCONTINUED | OUTPATIENT
Start: 2020-10-23 | End: 2020-10-23 | Stop reason: HOSPADM

## 2020-10-23 RX ORDER — HYDROMORPHONE HYDROCHLORIDE 1 MG/ML
0.5 INJECTION, SOLUTION INTRAMUSCULAR; INTRAVENOUS; SUBCUTANEOUS
Status: DISCONTINUED | OUTPATIENT
Start: 2020-10-23 | End: 2020-10-23 | Stop reason: HOSPADM

## 2020-10-23 RX ORDER — LISINOPRIL 20 MG/1
20 TABLET ORAL 2 TIMES DAILY
Status: DISCONTINUED | OUTPATIENT
Start: 2020-10-23 | End: 2020-10-24 | Stop reason: HOSPADM

## 2020-10-23 RX ORDER — EPHEDRINE SULFATE 50 MG/ML
INJECTION, SOLUTION INTRAVENOUS AS NEEDED
Status: DISCONTINUED | OUTPATIENT
Start: 2020-10-23 | End: 2020-10-23 | Stop reason: SURG

## 2020-10-23 RX ORDER — CEFAZOLIN SODIUM 2 G/100ML
2 INJECTION, SOLUTION INTRAVENOUS ONCE
Status: COMPLETED | OUTPATIENT
Start: 2020-10-23 | End: 2020-10-23

## 2020-10-23 RX ORDER — EPHEDRINE SULFATE 50 MG/ML
5 INJECTION, SOLUTION INTRAVENOUS ONCE AS NEEDED
Status: DISCONTINUED | OUTPATIENT
Start: 2020-10-23 | End: 2020-10-23 | Stop reason: HOSPADM

## 2020-10-23 RX ORDER — LIDOCAINE HYDROCHLORIDE 20 MG/ML
INJECTION, SOLUTION INFILTRATION; PERINEURAL AS NEEDED
Status: DISCONTINUED | OUTPATIENT
Start: 2020-10-23 | End: 2020-10-23 | Stop reason: SURG

## 2020-10-23 RX ORDER — ASPIRIN 81 MG/1
81 TABLET ORAL EVERY 12 HOURS SCHEDULED
Status: DISCONTINUED | OUTPATIENT
Start: 2020-10-23 | End: 2020-10-24 | Stop reason: HOSPADM

## 2020-10-23 RX ORDER — HYDROCODONE BITARTRATE AND ACETAMINOPHEN 7.5; 325 MG/1; MG/1
1 TABLET ORAL EVERY 4 HOURS PRN
Status: DISCONTINUED | OUTPATIENT
Start: 2020-10-23 | End: 2020-10-24 | Stop reason: HOSPADM

## 2020-10-23 RX ORDER — ROCURONIUM BROMIDE 10 MG/ML
INJECTION, SOLUTION INTRAVENOUS AS NEEDED
Status: DISCONTINUED | OUTPATIENT
Start: 2020-10-23 | End: 2020-10-23 | Stop reason: SURG

## 2020-10-23 RX ORDER — HYDRALAZINE HYDROCHLORIDE 20 MG/ML
5 INJECTION INTRAMUSCULAR; INTRAVENOUS
Status: DISCONTINUED | OUTPATIENT
Start: 2020-10-23 | End: 2020-10-23 | Stop reason: HOSPADM

## 2020-10-23 RX ORDER — SODIUM CHLORIDE, SODIUM LACTATE, POTASSIUM CHLORIDE, CALCIUM CHLORIDE 600; 310; 30; 20 MG/100ML; MG/100ML; MG/100ML; MG/100ML
9 INJECTION, SOLUTION INTRAVENOUS CONTINUOUS
Status: DISCONTINUED | OUTPATIENT
Start: 2020-10-23 | End: 2020-10-23 | Stop reason: HOSPADM

## 2020-10-23 RX ORDER — FENTANYL CITRATE 50 UG/ML
INJECTION, SOLUTION INTRAMUSCULAR; INTRAVENOUS AS NEEDED
Status: DISCONTINUED | OUTPATIENT
Start: 2020-10-23 | End: 2020-10-23 | Stop reason: SURG

## 2020-10-23 RX ORDER — DIPHENHYDRAMINE HCL 25 MG
25 CAPSULE ORAL
Status: DISCONTINUED | OUTPATIENT
Start: 2020-10-23 | End: 2020-10-23 | Stop reason: HOSPADM

## 2020-10-23 RX ORDER — HYDROMORPHONE HYDROCHLORIDE 1 MG/ML
0.5 INJECTION, SOLUTION INTRAMUSCULAR; INTRAVENOUS; SUBCUTANEOUS
Status: DISCONTINUED | OUTPATIENT
Start: 2020-10-23 | End: 2020-10-24 | Stop reason: HOSPADM

## 2020-10-23 RX ORDER — OXYCODONE AND ACETAMINOPHEN 7.5; 325 MG/1; MG/1
1 TABLET ORAL ONCE AS NEEDED
Status: DISCONTINUED | OUTPATIENT
Start: 2020-10-23 | End: 2020-10-23 | Stop reason: HOSPADM

## 2020-10-23 RX ORDER — TRANEXAMIC ACID 100 MG/ML
INJECTION, SOLUTION INTRAVENOUS AS NEEDED
Status: DISCONTINUED | OUTPATIENT
Start: 2020-10-23 | End: 2020-10-23 | Stop reason: SURG

## 2020-10-23 RX ORDER — FLUMAZENIL 0.1 MG/ML
0.2 INJECTION INTRAVENOUS AS NEEDED
Status: DISCONTINUED | OUTPATIENT
Start: 2020-10-23 | End: 2020-10-23 | Stop reason: HOSPADM

## 2020-10-23 RX ORDER — CETIRIZINE HYDROCHLORIDE 10 MG/1
10 TABLET ORAL DAILY
Status: DISCONTINUED | OUTPATIENT
Start: 2020-10-24 | End: 2020-10-24 | Stop reason: HOSPADM

## 2020-10-23 RX ORDER — ONDANSETRON 2 MG/ML
4 INJECTION INTRAMUSCULAR; INTRAVENOUS EVERY 6 HOURS PRN
Status: DISCONTINUED | OUTPATIENT
Start: 2020-10-23 | End: 2020-10-24 | Stop reason: HOSPADM

## 2020-10-23 RX ORDER — DEXAMETHASONE SODIUM PHOSPHATE 10 MG/ML
INJECTION INTRAMUSCULAR; INTRAVENOUS AS NEEDED
Status: DISCONTINUED | OUTPATIENT
Start: 2020-10-23 | End: 2020-10-23 | Stop reason: SURG

## 2020-10-23 RX ORDER — DIPHENHYDRAMINE HYDROCHLORIDE 50 MG/ML
12.5 INJECTION INTRAMUSCULAR; INTRAVENOUS
Status: DISCONTINUED | OUTPATIENT
Start: 2020-10-23 | End: 2020-10-23 | Stop reason: HOSPADM

## 2020-10-23 RX ORDER — MAGNESIUM HYDROXIDE 1200 MG/15ML
LIQUID ORAL AS NEEDED
Status: DISCONTINUED | OUTPATIENT
Start: 2020-10-23 | End: 2020-10-23 | Stop reason: HOSPADM

## 2020-10-23 RX ADMIN — PHENYLEPHRINE HYDROCHLORIDE 100 MCG: 10 INJECTION INTRAVENOUS at 07:13

## 2020-10-23 RX ADMIN — SODIUM CHLORIDE 100 ML/HR: 9 INJECTION, SOLUTION INTRAVENOUS at 10:58

## 2020-10-23 RX ADMIN — CEFAZOLIN SODIUM 2 G: 2 INJECTION, SOLUTION INTRAVENOUS at 07:15

## 2020-10-23 RX ADMIN — FAMOTIDINE 20 MG: 10 INJECTION INTRAVENOUS at 06:15

## 2020-10-23 RX ADMIN — KETAMINE HYDROCHLORIDE 10 MG: 10 INJECTION INTRAMUSCULAR; INTRAVENOUS at 07:58

## 2020-10-23 RX ADMIN — DOCUSATE SODIUM 100 MG: 100 CAPSULE ORAL at 10:58

## 2020-10-23 RX ADMIN — ASPIRIN 81 MG: 81 TABLET, COATED ORAL at 22:08

## 2020-10-23 RX ADMIN — SODIUM CHLORIDE, POTASSIUM CHLORIDE, SODIUM LACTATE AND CALCIUM CHLORIDE 9 ML/HR: 600; 310; 30; 20 INJECTION, SOLUTION INTRAVENOUS at 06:15

## 2020-10-23 RX ADMIN — KETAMINE HYDROCHLORIDE 10 MG: 10 INJECTION INTRAMUSCULAR; INTRAVENOUS at 08:36

## 2020-10-23 RX ADMIN — EPHEDRINE SULFATE 10 MG: 50 INJECTION INTRAVENOUS at 07:51

## 2020-10-23 RX ADMIN — SODIUM CHLORIDE, POTASSIUM CHLORIDE, SODIUM LACTATE AND CALCIUM CHLORIDE: 600; 310; 30; 20 INJECTION, SOLUTION INTRAVENOUS at 08:19

## 2020-10-23 RX ADMIN — GLYCOPYRROLATE 0.4 MG: 0.2 INJECTION INTRAMUSCULAR; INTRAVENOUS at 08:35

## 2020-10-23 RX ADMIN — NEOSTIGMINE METHYLSULFATE 3 MG: 1 INJECTION INTRAMUSCULAR; INTRAVENOUS; SUBCUTANEOUS at 08:35

## 2020-10-23 RX ADMIN — HYDROCODONE BITARTRATE AND ACETAMINOPHEN 1 TABLET: 7.5; 325 TABLET ORAL at 14:49

## 2020-10-23 RX ADMIN — PROPOFOL 180 MG: 10 INJECTION, EMULSION INTRAVENOUS at 07:04

## 2020-10-23 RX ADMIN — HYDROCODONE BITARTRATE AND ACETAMINOPHEN 1 TABLET: 7.5; 325 TABLET ORAL at 10:59

## 2020-10-23 RX ADMIN — ASPIRIN 81 MG: 81 TABLET, COATED ORAL at 10:58

## 2020-10-23 RX ADMIN — EPHEDRINE SULFATE 10 MG: 50 INJECTION INTRAVENOUS at 07:17

## 2020-10-23 RX ADMIN — CEFAZOLIN SODIUM 2 G: 2 INJECTION, SOLUTION INTRAVENOUS at 14:20

## 2020-10-23 RX ADMIN — MIDAZOLAM 1 MG: 1 INJECTION INTRAMUSCULAR; INTRAVENOUS at 06:15

## 2020-10-23 RX ADMIN — HYDROMORPHONE HYDROCHLORIDE 0.5 MG: 2 INJECTION, SOLUTION INTRAMUSCULAR; INTRAVENOUS; SUBCUTANEOUS at 08:34

## 2020-10-23 RX ADMIN — OXYCODONE HYDROCHLORIDE 10 MG: 10 TABLET, FILM COATED, EXTENDED RELEASE ORAL at 06:08

## 2020-10-23 RX ADMIN — KETAMINE HYDROCHLORIDE 30 MG: 10 INJECTION INTRAMUSCULAR; INTRAVENOUS at 07:27

## 2020-10-23 RX ADMIN — TRANEXAMIC ACID 1000 MG: 100 INJECTION, SOLUTION INTRAVENOUS at 07:17

## 2020-10-23 RX ADMIN — ROCURONIUM BROMIDE 40 MG: 10 INJECTION INTRAVENOUS at 07:04

## 2020-10-23 RX ADMIN — PHENYLEPHRINE HYDROCHLORIDE 100 MCG: 10 INJECTION INTRAVENOUS at 07:08

## 2020-10-23 RX ADMIN — FENTANYL CITRATE 50 MCG: 50 INJECTION INTRAMUSCULAR; INTRAVENOUS at 07:00

## 2020-10-23 RX ADMIN — DEXAMETHASONE SODIUM PHOSPHATE 8 MG: 10 INJECTION INTRAMUSCULAR; INTRAVENOUS at 07:13

## 2020-10-23 RX ADMIN — MELOXICAM 15 MG: 15 TABLET ORAL at 10:58

## 2020-10-23 RX ADMIN — LIDOCAINE HYDROCHLORIDE 60 MG: 20 INJECTION, SOLUTION INFILTRATION; PERINEURAL at 07:04

## 2020-10-23 RX ADMIN — ACETAMINOPHEN 1000 MG: 500 TABLET, FILM COATED ORAL at 06:08

## 2020-10-23 RX ADMIN — FENTANYL CITRATE 50 MCG: 50 INJECTION INTRAMUSCULAR; INTRAVENOUS at 07:25

## 2020-10-23 RX ADMIN — HYDROCODONE BITARTRATE AND ACETAMINOPHEN 1 TABLET: 7.5; 325 TABLET ORAL at 22:09

## 2020-10-23 RX ADMIN — ONDANSETRON HYDROCHLORIDE 4 MG: 2 SOLUTION INTRAMUSCULAR; INTRAVENOUS at 08:12

## 2020-10-23 RX ADMIN — DOCUSATE SODIUM 100 MG: 100 CAPSULE ORAL at 22:07

## 2020-10-23 RX ADMIN — GLYCOPYRROLATE 0.2 MG: 0.2 INJECTION INTRAMUSCULAR; INTRAVENOUS at 07:35

## 2020-10-23 NOTE — ANESTHESIA POSTPROCEDURE EVALUATION
"Patient: Christian Hurtado    Procedure Summary     Date: 10/23/20 Room / Location: [facility] OR  / [facility] MAIN OR    Anesthesia Start: 0657 Anesthesia Stop: 0844    Procedure: TOTAL HIP ARTHROPLASTY ANTERIOR WITH HANA TABLE (Right Hip) Diagnosis:       Primary osteoarthritis of right hip      (Primary osteoarthritis of right hip [M16.11])    Surgeon: Pia Morrison MD Provider: Asad Lyons MD    Anesthesia Type: general ASA Status: 2          Anesthesia Type: general    Vitals  Vitals Value Taken Time   /65 10/23/20 0930   Temp 36.5 °C (97.7 °F) 10/23/20 0940   Pulse 76 10/23/20 0945   Resp 16 10/23/20 0930   SpO2 97 % 10/23/20 0945   Vitals shown include unvalidated device data.        Post Anesthesia Care and Evaluation    Patient location during evaluation: bedside  Patient participation: complete - patient participated  Level of consciousness: awake and alert  Pain management: adequate  Airway patency: patent  Anesthetic complications: No anesthetic complications    Cardiovascular status: acceptable  Respiratory status: acceptable  Hydration status: acceptable    Comments: /66 (BP Location: Right arm, Patient Position: Sitting)   Pulse 63   Temp 36.2 °C (97.1 °F) (Skin)   Resp 16   Ht 170.2 cm (67\")   Wt 79.2 kg (174 lb 9.7 oz)   SpO2 94%   BMI 27.35 kg/m²           "

## 2020-10-23 NOTE — ANESTHESIA PREPROCEDURE EVALUATION
Anesthesia Evaluation     Patient summary reviewed and Nursing notes reviewed   no history of anesthetic complications:  NPO Solid Status: > 6 hours  NPO Liquid Status: > 6 hours           Airway   Mallampati: II  TM distance: >3 FB  Neck ROM: full  no difficulty expected and No difficulty expected  Dental - normal exam     Pulmonary - negative pulmonary ROS and normal exam    breath sounds clear to auscultation  (-) rhonchi, decreased breath sounds, wheezes, rales, stridor  Cardiovascular - normal exam    NYHA Classification: I  Rhythm: regular  Rate: normal    (+) hypertension,   (-) murmur, weak pulses, friction rub, systolic click, carotid bruits, JVD, peripheral edema      Neuro/Psych- negative ROS  GI/Hepatic/Renal/Endo - negative ROS     Musculoskeletal     Abdominal  - normal exam    Abdomen: soft.   Substance History - negative use     OB/GYN negative ob/gyn ROS         Other   arthritis,    history of cancer remission                    Anesthesia Plan    ASA 2     general     intravenous induction     Anesthetic plan, all risks, benefits, and alternatives have been provided, discussed and informed consent has been obtained with: patient.

## 2020-10-23 NOTE — ANESTHESIA PROCEDURE NOTES
Airway  Urgency: elective    Date/Time: 10/23/2020 7:07 AM  Airway not difficult    General Information and Staff    Patient location during procedure: OR  Anesthesiologist: Asad Lyons MD  CRNA: Becky Quan CRNA    Indications and Patient Condition  Indications for airway management: airway protection    Preoxygenated: yes  MILS maintained throughout  Mask difficulty assessment: 1 - vent by mask    Final Airway Details  Final airway type: endotracheal airway      Successful airway: ETT  Cuffed: yes   Successful intubation technique: direct laryngoscopy  Facilitating devices/methods: anterior pressure/BURP and Bougie  Endotracheal tube insertion site: oral  Blade: Martinez  Blade size: 2  ETT size (mm): 7.5  Cormack-Lehane Classification: grade IIb - view of arytenoids or posterior of glottis only  Placement verified by: chest auscultation   Cuff volume (mL): 8  Measured from: lips  ETT/EBT  to lips (cm): 23  Number of attempts at approach: 1  Assessment: lips, teeth, and gum same as pre-op and atraumatic intubation    Additional Comments  Pt preoxygenated, SIVI, bag mask vent, ATETI, dentition as before

## 2020-10-23 NOTE — DISCHARGE PLACEMENT REQUEST
"Christian Hurtado (69 y.o. Male)     Date of Birth Social Security Number Address Home Phone MRN    1950  [facility] [address]  [id number]    [employer] Status          Amish        Admission Date Admission Type Admitting Provider Attending Provider Department, Room/Bed    10/23/20 Elective Pia Morrison MD Yakkanti, Madhusudhan R, MD [address], P794/1    Discharge Date Discharge Disposition Discharge Destination                       Attending Provider: Pia Morrison MD    Allergies: Penicillins    Isolation: None   Infection: None   Code Status: CPR    Ht: 170.2 cm (67\")   Wt: 79.2 kg (174 lb 9.7 oz)    Admission Cmt: None   Principal Problem: Primary osteoarthritis of right hip [M16.11]                 Active Insurance as of 10/23/2020     Primary Coverage     Payor Plan Insurance Group Employer/Plan Group    MEDICARE MEDICARE A & B      Payor Plan Address Payor Plan Phone Number Payor Plan Fax Number Effective Dates    [address] [phone]  12/1/2013 - None Entered    [facility] [id number]       Subscriber Name Subscriber Birth Date Member ID       CHRISTIAN HURTADO 1950 [id number]           Secondary Coverage     Payor Plan Insurance Group Employer/Plan Group    AARP  SUP AARP HEALTH CARE OPTIONS      Payor Plan Address Payor Plan Phone Number Payor Plan Fax Number Effective Dates    [facility] [phone]  1/1/2020 - None Entered    [address]       [facility] [address]       Subscriber Name Subscriber Birth Date Member ID       CHRISTIAN HURTADO 1950 [id number]                 Emergency Contacts      (Rel.) Home Phone Work Phone Mobile Phone    ARTURO RUFF (Spouse) [phone] -- --              "

## 2020-10-23 NOTE — CONSULTS
"Internal medicine consult    Referring physician  Dr. BECKMAN    Chief complaint  Right total hip arthroplasty    Reason for consult  Follow medical problem    History of present illness  69-year-old white male with history of hypertension hyperlipidemia prostate cancer and severe osteoarthritis was electively admitted for right total hip arthroplasty secondary to disabling pain and failed outpatient treatment.  Patient underwent right total hip arthroplasty this morning without any complication and I am asked to follow the patient for medical problems.  Patient is a dictation and gave me a detailed history and at the time of interview hurting at the surgical site but no other complaint.  Patient denies any fever cough chest pain shortness of breath abdominal pain nausea vomiting diarrhea.    Past medical history  Hypertension  Hyperlipidemia  Osteoarthritis  Prostate cancer    Past surgical history  Unremarkable    Social history  Denies any tobacco alcohol drug abuse    Family history  Not contributory    Medications  No known medical allergies  Home medications reviewed    Review of systems  As above    Physical examination  Blood pressure 107/66, pulse 63, temperature 97.1 °F (36.2 °C), temperature source Skin, resp. rate 16, height 170.2 cm (67\"), weight 79.2 kg (174 lb 9.7 oz), SpO2 94 %.    HEENT unremarkable  Neck supple  Lungs clear bilaterally  Heart S1-S2  Abdomen soft nontender was positive  Extremities no edema  Neuro no focal deficit  Psych normal mood and behavior    LABS  Lab Results (last 24 hours)     ** No results found for the last 24 hours. **        Imaging Results (Last 24 Hours)     Procedure Component Value Units Date/Time    XR Hip With or Without Pelvis 1 View Right [[id number]] Collected: 10/23/20 0925     Updated: 10/23/20 0930    Narrative:      Right hip an pelvic radiograph     HISTORY:Postop right total hip arthroplasty     TECHNIQUE: AP low pelvis and AP right hip radiographs   "   COMPARISON:Right hip radiographs 10/16/2020     FINDINGS:  Post surgical changes from recent right total arthroplasty are present.  The hardware is intact. There is no new periprosthetic fracture.  Radiation seeds overlie the prostate.       Impression:      Postoperative changes from recent right total hip  arthroplasty without findings of immediate complication.     This report was finalized on 10/23/2020 9:27 AM by Dr. Ken Schaefer M.D.       XR Hip With or Without Pelvis 1 View Right [[id number]] Collected: 10/23/20 0827     Updated: 10/23/20 0831    Narrative:      INTRAOPERATIVE PORTABLE VIEW RIGHT HIP     CLINICAL INFORMATION: Post total hip arthroplasty     FINDINGS: A complicating process is not identified.     Fluoroscopy time 27 s, 2 images     This report was finalized on 10/23/2020 8:27 AM by Dr. Ken Schaefer M.D.       FL C Arm During Surgery [[id number]] Resulted: 10/23/20 0820     Updated: 10/23/20 0820    Narrative:      This procedure was auto-finalized with no dictation required.          Current Facility-Administered Medications:   •  acetaminophen (TYLENOL) tablet 1,000 mg, 1,000 mg, Oral, Q8H, Pia Morrison MD  •  aspirin EC tablet 81 mg, 81 mg, Oral, Q12H, Pia Morrison MD, 81 mg at 10/23/20 1058  •  [START ON 10/24/2020] bisacodyl (DULCOLAX) EC tablet 10 mg, 10 mg, Oral, Daily PRN, Pia Morrison MD  •  ceFAZolin in dextrose (ANCEF) IVPB solution 2 g, 2 g, Intravenous, Q8H, Pia Morrison MD, 2 g at 10/23/20 1420  •  [START ON 10/24/2020] cetirizine (zyrTEC) tablet 10 mg, 10 mg, Oral, Daily, Pia Morrison MD  •  docusate sodium (COLACE) capsule 100 mg, 100 mg, Oral, BID, Pia Morrison MD, 100 mg at 10/23/20 1058  •  HYDROcodone-acetaminophen (NORCO) 7.5-325 MG per tablet 1 tablet, 1 tablet, Oral, Q4H PRN, Pia Morrison MD, 1 tablet at 10/23/20 1449  •  HYDROcodone-acetaminophen (NORCO) 7.5-325 MG per tablet 2  tablet, 2 tablet, Oral, Q4H PRN, Pia Mrorison MD  •  HYDROmorphone (DILAUDID) injection 0.5 mg, 0.5 mg, Intravenous, Q2H PRN **AND** naloxone (NARCAN) injection 0.1 mg, 0.1 mg, Intravenous, Q5 Min PRN, Pia Morrison MD  •  lisinopril (PRINIVIL,ZESTRIL) tablet 20 mg, 20 mg, Oral, BID, Pia Morrison MD  •  melatonin tablet 1 mg, 1 mg, Oral, Nightly PRN, Pia Morrison MD  •  meloxicam (MOBIC) tablet 15 mg, 15 mg, Oral, Daily, Pia Morrison MD, 15 mg at 10/23/20 1058  •  ondansetron (ZOFRAN) tablet 4 mg, 4 mg, Oral, Q6H PRN **OR** ondansetron (ZOFRAN) injection 4 mg, 4 mg, Intravenous, Q6H PRN, Pia Morrison MD  •  sodium chloride 0.9 % infusion, 100 mL/hr, Intravenous, Continuous, Pia Morrison MD, Last Rate: 100 mL/hr at 10/23/20 1058, 100 mL/hr at 10/23/20 1058     ASSESSMENT  Right total hip arthroplasty  Severe osteoarthritis  Hypertension  Hyperlipidemia  Gastroesophageal reflux disease    PLAN  Agree with current care  Postop care  Resume home medications  Stress ulcer DVT prophylaxis  PT OT  Supportive care  Repeat labs in a.m.  Patient is full code  Discussed with nursing staff  We will follow with  and further recommendation current hospital course    CHARLOTTE QUINTANA MD

## 2020-10-23 NOTE — PLAN OF CARE
Goal Outcome Evaluation:  Plan of Care Reviewed With: patient  Progress: improving  Outcome Summary: S/P RTHA. VSS. Pain controlled with PO pain med. Dressing c/d/i. UP to chair and worked with PT today. Ambulates with assist and walker. Voiding without difficulty. Discussed bp med and monitoring r/t HTN. Plans to d/c home with HH tomorrow.

## 2020-10-23 NOTE — THERAPY EVALUATION
Patient Name: Christian Hurtado  : 1950    MRN: [id number]                              Today's Date: 10/23/2020       Admit Date: 10/23/2020    Visit Dx:     ICD-10-CM ICD-9-CM   1. Primary osteoarthritis of right hip  M16.11 715.15     Patient Active Problem List   Diagnosis   • Status post right hip replacement     Past Medical History:   Diagnosis Date   • Arthritis    • Cancer (CMS/HCC)     PROSTATE   • Hip pain    • Hypertension      Past Surgical History:   Procedure Laterality Date   • ANKLE SURGERY     • COLON SURGERY      SIGMOIDECTOMY-PERFORATION   • COLONOSCOPY     • WRIST SURGERY Right      General Information     Row Name 10/23/20 1321          Physical Therapy Time and Intention    Document Type  evaluation  -AE     Mode of Treatment  individual therapy;physical therapy  -AE     Row Name 10/23/20 1321          General Information    Patient Profile Reviewed  yes  -AE     Prior Level of Function  independent:  -AE     Existing Precautions/Restrictions  fall;hip, anterior  -AE     Row Name 10/23/20 1321          Living Environment    Lives With  spouse  -AE     Row Name 10/23/20 1321          Home Main Entrance    Number of Stairs, Main Entrance  six  -AE     Stair Railings, Main Entrance  railings safe and in good condition;railings on both sides of stairs  -AE     Row Name 10/23/20 1321          Stairs Within Home, Primary    Number of Stairs, Within Home, Primary  none  -AE     Row Name 10/23/20 1321          Cognition    Orientation Status (Cognition)  oriented x 4  -AE     Row Name 10/23/20 1321          Safety Issues, Functional Mobility    Impairments Affecting Function (Mobility)  pain;range of motion (ROM);strength  -AE       User Key  (r) = Recorded By, (t) = Taken By, (c) = Cosigned By    Initials Name Provider Type    AE Lacey Yañez PT Physical Therapist        Mobility     Row Name 10/23/20 1322          Bed Mobility    Bed Mobility  bed mobility (all) activities  -AE     All  Activities, Cutler (Bed Mobility)  modified independence  -AE     Row Name 10/23/20 1322          Transfers    Comment (Transfers)  CGA all transfers with FWW  -AE     Row Name 10/23/20 1322          Bed-Chair Transfer    Bed-Chair Cutler (Transfers)  contact guard  -AE     Assistive Device (Bed-Chair Transfers)  walker, front-wheeled  -AE     Row Name 10/23/20 1322          Sit-Stand Transfer    Sit-Stand Cutler (Transfers)  contact guard  -AE     Assistive Device (Sit-Stand Transfers)  walker, front-wheeled  -AE     Row Name 10/23/20 1322          Gait/Stairs (Locomotion)    Cutler Level (Gait)  contact guard  -AE     Assistive Device (Gait)  walker, front-wheeled  -AE     Cutler Level (Stairs)  stand by assist  -AE     Handrail Location (Stairs)  both sides  -AE     Number of Steps (Stairs)  4  -AE     Ascending Technique (Stairs)  step-to-step  -AE     Descending Technique (Stairs)  step-to-step  -AE       User Key  (r) = Recorded By, (t) = Taken By, (c) = Cosigned By    Initials Name Provider Type    AE Lacey Yañez PT Physical Therapist        Obj/Interventions     Row Name 10/23/20 1324          Range of Motion Comprehensive    Comment, General Range of Motion  BLE WFL per surgical precautions  -AE     Row Name 10/23/20 1324          Strength Comprehensive (MMT)    Comment, General Manual Muscle Testing (MMT) Assessment  RLE generalized weakness 2/2 pain  -AE     Row Name 10/23/20 1324          Motor Skills    Therapeutic Exercise  hip  -AE     Additional Documentation  -- R RANDALL x 10  -AE     Row Name 10/23/20 1324          Hip (Therapeutic Exercise)    Hip (Therapeutic Exercise)  AROM (active range of motion)  -AE     Hip AROM (Therapeutic Exercise)  right  -AE       User Key  (r) = Recorded By, (t) = Taken By, (c) = Cosigned By    Initials Name Provider Type    Lacey Kramer PT Physical Therapist        Goals/Plan     Row Name 10/23/20 1325          Transfer Goal  1 (PT)    Activity/Assistive Device (Transfer Goal 1, PT)  transfers, all  -AE     Lindsay Level/Cues Needed (Transfer Goal 1, PT)  supervision required  -AE     Time Frame (Transfer Goal 1, PT)  2 days  -AE     Progress/Outcome (Transfer Goal 1, PT)  continuing progress toward goal  -AE     Row Name 10/23/20 132          Gait Training Goal 1 (PT)    Activity/Assistive Device (Gait Training Goal 1, PT)  gait (walking locomotion)  -AE     Lindsay Level (Gait Training Goal 1, PT)  supervision required  -AE     Distance (Gait Training Goal 1, PT)  150ft  -AE     Time Frame (Gait Training Goal 1, PT)  2 days  -AE     Progress/Outcome (Gait Training Goal 1, PT)  continuing progress toward goal  -AE       User Key  (r) = Recorded By, (t) = Taken By, (c) = Cosigned By    Initials Name Provider Type    AE Lacey Yañez, PT Physical Therapist        Clinical Impression     Row Name 10/23/20 1327          Pain    Additional Documentation  Pain Scale: Numbers Pre/Post-Treatment (Group)  -AE     Row Name 10/23/20 4714          Pain Scale: Numbers Pre/Post-Treatment    Pretreatment Pain Rating  3/10  -AE     Posttreatment Pain Rating  8/10  -AE     Pain Location - Side  Right  -AE     Pain Location - Orientation  incisional  -AE     Pain Location  hip  -AE     Pain Intervention(s)  Repositioned;Ambulation/increased activity;Rest  -AE     Row Name 10/23/20 1320          Plan of Care Review    Plan of Care Reviewed With  patient  -AE     Row Name 10/23/20 1325          Therapy Assessment/Plan (PT)    Patient/Family Therapy Goals Statement (PT)  Plans to DC home tomorrow  -AE     Rehab Potential (PT)  good, to achieve stated therapy goals  -AE     Criteria for Skilled Interventions Met (PT)  yes;meets criteria  -AE     Row Name 10/23/20 1321          Positioning and Restraints    Pre-Treatment Position  in bed  -AE     Post Treatment Position  chair  -AE     In Chair  reclined;call light within reach;encouraged to  call for assist;exit alarm on  -AE       User Key  (r) = Recorded By, (t) = Taken By, (c) = Cosigned By    Initials Name Provider Type    Lacey Kramer PT Physical Therapist        Outcome Measures     Row Name 10/23/20 1326          How much help from another person do you currently need...    Turning from your back to your side while in flat bed without using bedrails?  4  -AE     Moving from lying on back to sitting on the side of a flat bed without bedrails?  4  -AE     Moving to and from a bed to a chair (including a wheelchair)?  4  -AE     Standing up from a chair using your arms (e.g., wheelchair, bedside chair)?  4  -AE     Climbing 3-5 steps with a railing?  4  -AE     To walk in hospital room?  4  -AE     AM-PAC 6 Clicks Score (PT)  24  -AE     Row Name 10/23/20 1326          Functional Assessment    Outcome Measure Options  AM-PAC 6 Clicks Basic Mobility (PT)  -AE       User Key  (r) = Recorded By, (t) = Taken By, (c) = Cosigned By    Initials Name Provider Type    AE Lacey Yañez PT Physical Therapist        Physical Therapy Education                 Title: PT OT SLP Therapies (Done)     Topic: Physical Therapy (Done)     Point: Mobility training (Done)     Learning Progress Summary           Patient Eager, E,TB, VU,DU by AE at 10/23/2020 1328                   Point: Home exercise program (Done)     Learning Progress Summary           Patient Eager, E,TB, VU,DU by AE at 10/23/2020 1328                   Point: Body mechanics (Done)     Learning Progress Summary           Patient Eager, E,TB, VU,DU by AE at 10/23/2020 1328                   Point: Precautions (Done)     Learning Progress Summary           Patient Eager, E,TB, VU,DU by AE at 10/23/2020 1328                               User Key     Initials Effective Dates Name Provider Type Discipline    AE 09/04/19 -  Lacey Yañez PT Physical Therapist PT              PT Recommendation and Plan     Plan of Care Reviewed With:  patient     Time Calculation:   PT Charges     Row Name 10/23/20 1329             Time Calculation    Start Time  1305  -AE      Stop Time  1329  -AE      Time Calculation (min)  24 min  -AE      PT Received On  10/23/20  -AE      PT - Next Appointment  10/24/20  -AE      PT Goal Re-Cert Due Date  10/26/20  -AE         Time Calculation- PT    Total Timed Code Minutes- PT  20 minute(s)  -AE        User Key  (r) = Recorded By, (t) = Taken By, (c) = Cosigned By    Initials Name Provider Type    AE Lacey Yañez, PT Physical Therapist        Therapy Charges for Today     Code Description Service Date Service Provider Modifiers Qty    55538252869 HC PT EVAL LOW COMPLEXITY 2 10/23/2020 Lacey Yañez, PT GP 1    30148819758 HC PT THER PROC EA 15 MIN 10/23/2020 Lacey Yañez PT GP 1          PT G-Codes  Outcome Measure Options: AM-PAC 6 Clicks Basic Mobility (PT)  AM-PAC 6 Clicks Score (PT): 24    Lacey Yañez PT  10/23/2020

## 2020-10-23 NOTE — OP NOTE
Operative  Note    Patient: Christian Hurtado  YOB: 1950    Medical Record Number: [id number]    Attending Physician: Pia Morrison,*    Date of Service: 10/23/2020     Pre-op Diagnosis:   Primary osteoarthritis of right hip [M16.11]    Post-Op Diagnosis Codes:     * Primary osteoarthritis of right hip [M16.11]    PROCEDURE PERFORMED: RIGHT TOTAL HIP ARTHROPLASTY ANTERIOR WITH HANA TABLE by utilizing a Direct anterior approach with  Depuy   [place]  Porocoat Acetabular  Shell Sector with  holes size 52 mm outer diameter Neutral ALTRX  Polyethylene acetabular  liner- 36 mm internal diameter,   Actis Duo fix Cementless High Offset Collar femoral stem size # 8 with a   Delta ceramic femoral head- 36 mm outer diameter, + 1.5 neck length by utilizing a [place] table ([employer]).     Implant Name Type Inv. Item Serial No.  Lot No. LRB No. Used Action   SUT FW #2 W/TPR NDL 1/2 CIR 38IN 97CM 26.5MM HAILEY - [id number] Implant SUT FW #2 W/TPR NDL 1/2 CIR 38IN 97CM 26.5MM HAILEY  ARTHREX [id number] Right 2 Implanted   SUT CONTRL TISS STRATAFIX SPIRAL PDS PLS CT1 2-0 45CM - [id number] Implant SUT CONTRL TISS STRATAFIX SPIRAL PDS PLS CT1 2-0 45CM  [employer]  DIV OF MILY AND MILY [id number] Right 1 Implanted   LINER ACET ALTRX PINN NTRL [id number] - [id number] Implant LINER ACET ALTRX PINN NTRL [id number]  DEPUY [id number] Right 1 Implanted   CUP ACET PINN SECTOR 52MM - [id number] Implant CUP ACET PINN SECTOR 52MM  DEPUY [id number] Right 1 Implanted   HD FEM BIOLOXDELTA/ART CERAM 36MM PLS1.5 - [id number] Implant HD FEM BIOLOXDELTA/ART CERAM 36MM PLS1.5  DEPUY [id number] Right 1 Implanted   STEM FEM ACTIS COLR TPR CMTLS HI/OFFST SZ8 - [id number] Implant STEM FEM ACTIS COLR TPR CMTLS HI/OFFST SZ8  DEPUY SYNTHES [id number] Right 1 Implanted       SURGEON: Pia Morrison MD     ASSISTANT: Paulo Andrews, DO Fellow     ANA MARÍA Barnes    The services of a first assist were necessary for performing the procedure  safely and expeditiously.  The first assist was present for the entire duration of the case and helped with positioning, retraction and closure of the incision.     ANESTHESIA: General  Anesthesiologist: Asad Lyons MD  CRNA: Becky Quan CRNA     Estimated Blood Loss: 100 mL    Specimens: * No orders in the log *    COMPLICATIONS: Nil.     DRAINS: * No LDAs found *    INDICATIONS: The patient is a 69 y.o. male who presented to  my office with complaints of progressively worsening pain in the hip. The patient has reached a point of disability secondary to the severe pain and immobility. The patient had difficulty with activities of daily living.  The patient has failed nonoperative management.      The medical history was reviewed. The patient was indicated for a  total hip arthroplasty. Likely risks and benefits of the procedure including, but not limited to infection, DVT, pulmonary embolism, leg length discrepancy, recurrent dislocation, possibility of injury to nerves or vessels, and periprosthetic fractures, Possibility of medical complications including but not limited to stroke, heart attack have been discussed in detail. Despite the risks involved, the patient elected to proceed and informed consent was obtained. The patient was seen in the preoperative holding area, and the  operative site was marked.     DESCRIPTION OF PROCEDURE: The patient has been transferred to [facility] Operating Room.   Preoperative antibiotics in the form of  Kefzol was given intravenously prior to the incision.   After achieving adequate general anesthesia, the patient was transferred onto the [place] table. All bony prominences were padded adequately.   The operative hip was prepped and draped in the usual sterile fashion.   Surgical timeout was done. Correct patient, surgical side and site were identified.  Tranexamic acid was given intravenously at the time of skin incision.    A skin incision was made  overlying the anterolateral aspect of the  hip for about 10 cm from just below and lateral to the anterior/superior iliac spine. Skin and subcutaneous tissue were incised and the deep fascia was incised in line with the skin incision overlying the tensor fascia domenic muscle. The tensor muscle was retracted laterally and interval between the sartorius and the rectus femoris medially and the tensor fascia domenic muscle was developed. The lateral circumflex femoral vessels were identified. They were clamped, cut, and ligated. Unnamed deep fascia was incised. Capsule of the hip joint was identified. Retractors were placed extracapsularly.     The capsule was incised in a L-shaped manner and the anterior capsule was tagged with FiberWire.  Followed by this, the retractors were placed intracapsularly. There was a large effusion and a thickened capsule.     Appropriate capsular releases were done along the inferior and  medial neck and along the saddle of the femoral neck.  The femoral neck was identified. The femoral neck osteotomy was done according to the preoperative templating , utilizing an oscillating saw. Femoral head was extracted using a corkscrew without any difficulty. The femoral head revealed presence of extensive loss of articular cartilage in the superior weight bearing portion along with femoral head osteophytes.      The acetabular cavity was inspected and exposed appropriately by placing retractors taking care to protect the anterior neurovascular structures. . The labrum was excised, pulvinar was excised from the cotyloid fossa. Acetabular cavity was progressively reamed, maintaining the correct inclination and version under image intensifier control. Adequate medialization was obtained. Adequate fit was found to be obtained at reamer size 52 .  The  [place]  Porocoat  Acetabular Shell Sector with  holes  52 mm was seated into position. It was found to be seated satisfactorily with adequate inclination  and anteversion. There was good stability. Followed by this, a polyethylene liner was seated into position, checked for stability. This was a 36 mm internal diameter,  highly cross linked neutral 0° lip liner.     The periarticular tissue was infiltrated with local anaesthetic injection which consisted of Naropin, clonidine and ketorolac mixture.     Attention was then directed to the proximal femur. The proximal femur was delivered by utilizing a trochanteric hook placed just distal to the vastus tubercle and proximal to the gluteus dwayne tendon. The leg was then externally rotated with the gross traction released and  hyperextension, adduction was done using the [place]ola table spar. The mechanical lift on the table was utilized to support the femur.  Appropriate capsular releases were made to expose the medial slope of the greater trochanter and the proximal femur was delivered. The femoral canal was entered by removing the lateral femoral neck with a box chisel followed by serial broaching. Adequate fit was found to be obtained with a size 8 broach. A trial reduction was performed. Leg lengths and offset were found to be satisfactory under image intensifier on comparison with the opposite hip under image intensifier. Reduction was found to be satisfactory and there was good stability with range of motion of the hip in internal and external rotation. Having been satisfied with the trials, the hip joint was dislocated.     The femoral  trial broach was removed and  Actis Duo fix Cementless High offset Collar femoral stem size # 8 was seated into position. This was found to be satisfactorily seated with good stability.      The delta ceramic femoral head 36 mm +1.5 mm neck length was seated onto the trunnion and impacted. Followed by this, the hip joint was reduced. Reduction was found to be satisfactory and image confirmed leg length, offset , implant position and stem fill of the femoral canal.  There were no  iatrogenic fractures. Hip joint was thoroughly irrigated with saline. Soft tissue hemostasis was secured. The sponge count and needle count was correct. The FiberWire sutures was tagged to the anterior capsular flaps and they were tied to each other. The incision was closed in layers with vicryl and monocryl sutures and the patient was transferred to the recovery room in a stable condition.     The patient tolerated the procedure well. There were no complications. The patient had adequate distal pulses and good capillary refill.     The patient will be mobilized with physical therapy.   Antibiotic prophylaxis  in the form of Kefzol postoperatively two more doses will be given in the first 24 hours.   The  patient will be started on DVT prophylaxis with  Aspirin 81 mg twice daily starting on postoperative day #1.    I discussed the satisfactory performance of the procedure with the patient's family and discussed with them the postoperative management.    CPT CODE : 47847    Pai Morrison MD     Date: 10/23/2020  Time: 08:24 EDT    cc: Provider, No Known; MD Tamiko Correa, Pia SILVESTRE,*

## 2020-10-23 NOTE — PROGRESS NOTES
Discharge Planning Assessment  [facility]     Patient Name: Christian Hurtado  MRN: [id number]  Today's Date: 10/23/2020    Admit Date: 10/23/2020    Discharge Needs Assessment     Row Name 10/23/20 1230       Living Environment    Lives With  spouse    Current Living Arrangements  home/apartment/condo    Primary Care Provided by  self    Provides Primary Care For  no one    Family Caregiver if Needed  spouse    Quality of Family Relationships  helpful;involved;supportive    Able to Return to Prior Arrangements  yes       Resource/Environmental Concerns    Transportation Concerns  car, none       Transition Planning    Patient/Family Anticipates Transition to  home with family;home with help/services    Patient/Family Anticipated Services at Transition      Transportation Anticipated  family or friend will provide       Discharge Needs Assessment    Readmission Within the Last 30 Days  no previous admission in last 30 days    Equipment Currently Used at Home  none    Discharge Facility/Level of Care Needs  home with home health        Discharge Plan     Row Name 10/23/20 1230       Plan    Plan  Home with family support & [employer]     Patient/Family in Agreement with Plan  yes    Plan Comments  Spoke with pt, verified correct information on facesheet and explained the role of CCP. Pt would like to d/c home with [facility], referral sent in Epic to [facility]. Plan will be to d/c home with [facility] and family support. Patient's wife/Sheba will tranpsort him home by car at d/c. No other needs identified. CCP following.        Continued Care and Services - Admitted Since 10/23/2020     Home Medical Care     Service Provider Request Status Selected Services Address Phone Fax    [facility]  Pending - [facility] N[facility]A [id number] [facility] [address] [address] [phone]                Demographic Summary     Row Name 10/23/20 1230       General Information    Admission Type   inpatient    Reason for Consult  discharge planning    Preferred Language  English     Used During This Interaction  no       Contact Information    Permission Granted to Share Info With  ;family/designee        Functional Status     Row Name 10/23/20 1230       Functional Status    Usual Activity Tolerance  good    Current Activity Tolerance  good       Functional Status, IADL    Medications  independent    Meal Preparation  independent    Housekeeping  independent    Laundry  independent    Shopping  independent       Mental Status Summary    Recent Changes in Mental Status/Cognitive Functioning  no changes        Psychosocial     Row Name 10/23/20 1230       Intellectual Performance WDL    Level of Consciousness  Alert       Coping/Stress    Patient Personal Strengths  able to adapt    Sources of Support  spouse    Reaction to Health Status  accepting    Understanding of Condition and Treatment  adequate understanding of medical condition       Developmental Stage (Eriksson's)    Developmental Stage  Stage 8 (65 years-death/Late Adulthood) Integrity vs. Despair        Abuse/Neglect    No documentation.       Legal    No documentation.       Substance Abuse    No documentation.       Patient Forms    No documentation.           Asuncion Maldonado RN

## 2020-10-23 NOTE — PLAN OF CARE
Problem: Adult Inpatient Plan of Care  Goal: Plan of Care Review  Outcome: Ongoing, Progressing  Flowsheets  Taken 10/23/2020 1331  Outcome Summary: Pt is a 68 yo M POD0 R RANDALL-anterior approach. Pt has 6 steps B rails to enter. Required SBA to ascend/descend 4 steps during therapy session. Pt is SBA/CGA for all mobility including transfers and gait up to 120ft with FWW. Ordered FWW needed for home DC. Safe and appropriate for home DC with HHPT to follow.  Taken 10/23/2020 1324  Plan of Care Reviewed With: patient

## 2020-10-24 VITALS
HEART RATE: 82 BPM | TEMPERATURE: 98.2 F | WEIGHT: 174.6 LBS | BODY MASS INDEX: 27.4 KG/M2 | RESPIRATION RATE: 16 BRPM | SYSTOLIC BLOOD PRESSURE: 113 MMHG | DIASTOLIC BLOOD PRESSURE: 59 MMHG | HEIGHT: 67 IN | OXYGEN SATURATION: 97 %

## 2020-10-24 LAB
ALBUMIN SERPL-MCNC: 3.3 G/DL (ref 3.5–5.2)
ALBUMIN/GLOB SERPL: 1.8 G/DL
ALP SERPL-CCNC: 42 U/L (ref 39–117)
ALT SERPL W P-5'-P-CCNC: 17 U/L (ref 1–41)
ANION GAP SERPL CALCULATED.3IONS-SCNC: 9.6 MMOL/L (ref 5–15)
AST SERPL-CCNC: 22 U/L (ref 1–40)
BASOPHILS # BLD AUTO: 0.03 10*3/MM3 (ref 0–0.2)
BASOPHILS NFR BLD AUTO: 0.3 % (ref 0–1.5)
BILIRUB SERPL-MCNC: 0.5 MG/DL (ref 0–1.2)
BUN SERPL-MCNC: 28 MG/DL (ref 8–23)
BUN/CREAT SERPL: 23.7 (ref 7–25)
CALCIUM SPEC-SCNC: 8.6 MG/DL (ref 8.6–10.5)
CHLORIDE SERPL-SCNC: 103 MMOL/L (ref 98–107)
CHOLEST SERPL-MCNC: 108 MG/DL (ref 0–200)
CO2 SERPL-SCNC: 20.4 MMOL/L (ref 22–29)
CREAT SERPL-MCNC: 1.18 MG/DL (ref 0.76–1.27)
DEPRECATED RDW RBC AUTO: 42.3 FL (ref 37–54)
EOSINOPHIL # BLD AUTO: 0.04 10*3/MM3 (ref 0–0.4)
EOSINOPHIL NFR BLD AUTO: 0.3 % (ref 0.3–6.2)
ERYTHROCYTE [DISTWIDTH] IN BLOOD BY AUTOMATED COUNT: 12.7 % (ref 12.3–15.4)
GFR SERPL CREATININE-BSD FRML MDRD: 61 ML/MIN/1.73
GLOBULIN UR ELPH-MCNC: 1.8 GM/DL
GLUCOSE SERPL-MCNC: 107 MG/DL (ref 65–99)
HBA1C MFR BLD: 5.7 % (ref 4.8–5.6)
HCT VFR BLD AUTO: 34.6 % (ref 37.5–51)
HDLC SERPL-MCNC: 33 MG/DL (ref 40–60)
HGB BLD-MCNC: 11.6 G/DL (ref 13–17.7)
IMM GRANULOCYTES # BLD AUTO: 0.08 10*3/MM3 (ref 0–0.05)
IMM GRANULOCYTES NFR BLD AUTO: 0.7 % (ref 0–0.5)
LDLC SERPL CALC-MCNC: 53 MG/DL (ref 0–100)
LDLC/HDLC SERPL: 1.53 {RATIO}
LYMPHOCYTES # BLD AUTO: 1.3 10*3/MM3 (ref 0.7–3.1)
LYMPHOCYTES NFR BLD AUTO: 11.3 % (ref 19.6–45.3)
MCH RBC QN AUTO: 30.2 PG (ref 26.6–33)
MCHC RBC AUTO-ENTMCNC: 33.5 G/DL (ref 31.5–35.7)
MCV RBC AUTO: 90.1 FL (ref 79–97)
MONOCYTES # BLD AUTO: 1.15 10*3/MM3 (ref 0.1–0.9)
MONOCYTES NFR BLD AUTO: 10 % (ref 5–12)
NEUTROPHILS NFR BLD AUTO: 77.4 % (ref 42.7–76)
NEUTROPHILS NFR BLD AUTO: 8.87 10*3/MM3 (ref 1.7–7)
NRBC BLD AUTO-RTO: 0 /100 WBC (ref 0–0.2)
NT-PROBNP SERPL-MCNC: 53.1 PG/ML (ref 0–900)
PLATELET # BLD AUTO: 198 10*3/MM3 (ref 140–450)
PMV BLD AUTO: 10.8 FL (ref 6–12)
POTASSIUM SERPL-SCNC: 4.8 MMOL/L (ref 3.5–5.2)
PROT SERPL-MCNC: 5.1 G/DL (ref 6–8.5)
RBC # BLD AUTO: 3.84 10*6/MM3 (ref 4.14–5.8)
SODIUM SERPL-SCNC: 133 MMOL/L (ref 136–145)
TRIGL SERPL-MCNC: 123 MG/DL (ref 0–150)
TSH SERPL DL<=0.05 MIU/L-ACNC: 0.53 UIU/ML (ref 0.27–4.2)
VLDLC SERPL-MCNC: 22 MG/DL (ref 5–40)
WBC # BLD AUTO: 11.47 10*3/MM3 (ref 3.4–10.8)

## 2020-10-24 PROCEDURE — 85025 COMPLETE CBC W/AUTO DIFF WBC: CPT | Performed by: ORTHOPAEDIC SURGERY

## 2020-10-24 PROCEDURE — 83036 HEMOGLOBIN GLYCOSYLATED A1C: CPT | Performed by: HOSPITALIST

## 2020-10-24 PROCEDURE — 80061 LIPID PANEL: CPT | Performed by: HOSPITALIST

## 2020-10-24 PROCEDURE — 84443 ASSAY THYROID STIM HORMONE: CPT | Performed by: HOSPITALIST

## 2020-10-24 PROCEDURE — 25010000003 CEFAZOLIN IN DEXTROSE 2-4 GM/100ML-% SOLUTION: Performed by: ORTHOPAEDIC SURGERY

## 2020-10-24 PROCEDURE — 97165 OT EVAL LOW COMPLEX 30 MIN: CPT

## 2020-10-24 PROCEDURE — 80053 COMPREHEN METABOLIC PANEL: CPT | Performed by: HOSPITALIST

## 2020-10-24 PROCEDURE — 83880 ASSAY OF NATRIURETIC PEPTIDE: CPT | Performed by: HOSPITALIST

## 2020-10-24 RX ORDER — ONDANSETRON 4 MG/1
4 TABLET, FILM COATED ORAL EVERY 6 HOURS PRN
Qty: 30 TABLET | Refills: 0 | Status: SHIPPED | OUTPATIENT
Start: 2020-10-24 | End: 2020-11-03

## 2020-10-24 RX ORDER — ASPIRIN 81 MG/1
81 TABLET ORAL EVERY 12 HOURS SCHEDULED
Qty: 60 TABLET | Refills: 0 | Status: SHIPPED | OUTPATIENT
Start: 2020-10-24 | End: 2020-11-23

## 2020-10-24 RX ORDER — BISACODYL 5 MG/1
10 TABLET, DELAYED RELEASE ORAL DAILY PRN
Qty: 20 TABLET | Refills: 0 | Status: SHIPPED | OUTPATIENT
Start: 2020-10-24

## 2020-10-24 RX ORDER — PSEUDOEPHEDRINE HCL 30 MG
100 TABLET ORAL 2 TIMES DAILY
Qty: 30 CAPSULE | Refills: 0 | Status: SHIPPED | OUTPATIENT
Start: 2020-10-24 | End: 2020-11-08

## 2020-10-24 RX ORDER — HYDROCODONE BITARTRATE AND ACETAMINOPHEN 7.5; 325 MG/1; MG/1
1 TABLET ORAL EVERY 4 HOURS PRN
Qty: 42 TABLET | Refills: 0 | Status: SHIPPED | OUTPATIENT
Start: 2020-10-24 | End: 2020-10-31

## 2020-10-24 RX ADMIN — DOCUSATE SODIUM 100 MG: 100 CAPSULE ORAL at 09:36

## 2020-10-24 RX ADMIN — ASPIRIN 81 MG: 81 TABLET, COATED ORAL at 09:36

## 2020-10-24 RX ADMIN — CETIRIZINE HYDROCHLORIDE 10 MG: 10 TABLET, FILM COATED ORAL at 09:37

## 2020-10-24 RX ADMIN — HYDROCODONE BITARTRATE AND ACETAMINOPHEN 2 TABLET: 7.5; 325 TABLET ORAL at 09:37

## 2020-10-24 RX ADMIN — CEFAZOLIN SODIUM 2 G: 2 INJECTION, SOLUTION INTRAVENOUS at 00:27

## 2020-10-24 RX ADMIN — HYDROCODONE BITARTRATE AND ACETAMINOPHEN 1 TABLET: 7.5; 325 TABLET ORAL at 15:53

## 2020-10-24 RX ADMIN — LISINOPRIL 20 MG: 20 TABLET ORAL at 09:40

## 2020-10-24 NOTE — DISCHARGE INSTRUCTIONS
Discharge and Follow up Instructions:      I. ACTIVITIES:  1. Exercises:  · Complete exercise program as taught by the hospital physical therapist 2 times per day  · Exercise program will be advanced by the physical therapist  · During the day be up ambulating every 2 hours (while awake) for short distances  · Complete the ankle pump exercises at least 10 times per hour (while awake)  · Elevate legs when in bed and for at least 30 minutes during the day.Use cold packs 20-30 minutes approximately 5 times per day. This should be done before and after completing your exercises and at any time you are experiencing pain/ stiffness in your operative extremity.        2. Activities of Daily Living:  · No tub baths, hot tubs, or swimming pools for 4 weeks  · May shower and let water run over the incision on post-operative day #5 if no drainage. Do not scrub or rub the incision. Simply let the water run over the incision and pat dry.     II. Restrictions  · Continue Anterior hip precautions as taught at the hospital  · Your surgeon will discuss with you when you will be able to drive again. Usual guidelines are you are to be off pain medications prior to driving.  · Weight bearing is as tolerated  · First week stay inside on even terrain. May go up and down stairs one stair at a time utilizing the hand rail once cleared by physical therapy to do so.  · After one week, you may venture outside (if cleared to do so by physical therapist).     III. Precautions:  · Everyone that comes near you should wash their hands  · No elective dental, genital-urinary, or colon procedures or surgical procedures for 12 weeks after surgery unless absolutely necessary.  ·  If dental work or surgical procedure is deemed absolutely necessary, you will need to contact your surgeon as you will need to take antibiotics 1 hour prior to any dental work (including teeth cleanings).  · Please discuss with your surgeon prophylactic antibiotics as the  length of time this intervention will be necessary for you varies with each patient’s health history and situation.  · Avoid sick people. If you must be around someone who is ill, they should wear a mask.  · Avoid visits to the Emergency Room or Urgent Care. If you feel you need to go to the Emergency Room, please notify your Surgeon's office.  · Stockings are to be worn for one week after surgery and are to be placed on in the morning and removed at night. Observe your skin when stocking is removed for any problems. Monitor the stockings to ensure that any swelling is not causing the stockings to become too tight. In this case, remove stockings immediately.        IV. INCISION CARE:  · Wash your hands prior to dressing changes  · Change the dressing as needed to keep incision clean and dry. Utilize dry gauze and paper tape. Avoid touching the side of the gauze that goes against the incision with your hands.  · No creams or ointments to the incision  · May remove dressing once the incision is free of drainage  · Do not touch or pick at the incision  · Check incision every day and notify surgeon immediately if any of the following signs or symptoms are noted:  ? Increase in redness  ? Increase in swelling around the incision and of the entire extremity  ? Increase in pain  ? Drainage oozing from the incision  ? Pulling apart of the edges of the incision  ? Increase in overall body temperature (greater than 100.5 degrees)     ·  You have absorbable sutures with steristrips, please do not remove the          steristrips for 14 days, you can shower on them 6 days after surgery.                   V. Medications:   1. Anticoagulants: You will be discharged on an anticoagulant. This is a prophylactic medication that helps prevent blood clots during your post-operative period.  You will be on Aspirin  81 mg twice daily for 30 days. If you were on Aspirin 81 mg prior to surgery you can go back to home dose once the 30 days  are completed.      · While taking the anticoagulant, you should avoid taking any additional aspirin, ibuprofen (Advil or Motrin), Aleve (Naprosyn) or other non-steroidal anti-inflammatory medications.   · Notify surgeon immediately if any austyn bleeding is noted in the urine, stool, emesis, or from the nose or the incision. Blood in the stool will often appear as black rather than red. Blood in urine may appear as pink. Blood in emesis may appear as brown/black like coffee grounds.  · You will need to apply pressure for longer periods of time to any cuts or abrasions to stop bleeding  · Avoid alcohol while taking anticoagulants     2. Stool Softeners: You will be at greater risk of constipation after surgery due to being less mobile and the pain medications.   · Take stool softeners as instructed by your surgeon while on pain medications. Over the counter Colace 100 mg 1-2 capsules twice daily.   · If stools become too loose or too frequent, please decreases the dosage or stop the stool softener.  · If constipation occurs despite use of stool softeners, you are to continue the stool softeners and add a laxative (Milk of Magnesia 1 ounce daily as needed).  · Dulcolax oral tabs or suppository, or a fleets enema can also be utilized for constipation and can be obtained over the counter.   · If above interventions are unsuccessful in inducing bowel movements, please contact your surgeon's office / family physician's office.  · Drink plenty of fluids, and eat fruits and vegetables during your recovery time     3. Pain Medications utilized after surgery are narcotics and the law requires that the following information be given to all patients that are prescribed narcotics:  · CLASSIFICATION: Pain medications are called Opioids and are narcotics  · LEGALITIES: It is illegal to share narcotics with others and to drive within 24 hours of taking narcotics  · POTENTIAL SIDE EFFECTS: Potential side effects of opioids include:  nausea, vomiting, itching, dizziness, drowsiness, dry mouth, constipation, and difficulty urinating.  · POTENTIAL ADVERSE EFFECTS:   ? Opioid tolerance can develop with use of pain medications and this simply means that it requires more and more of the medication to control pain; however, this is seen more in patients that use opioids for longer periods of time.  ? Opioid dependence can develop with use of Opioids and this simply means that to stop the medication can cause withdrawal symptoms; however, this is seen with patients that use Opioids for longer periods of time.  ? Opioid addiction can develop with use of Opioids and the incidence of this is very unlikely in patients who take the medications as ordered and stop the medications as instructed.  ? Opioid overdose can be dangerous, but is unlikely when the medication is taken as ordered and stopped when ordered. It is important not to mix opioids with alcohol or with and type of sedative such as Benadryl as this can lead to over sedation and respiratory difficulty.  · DOSAGE:   ? Pain medications will need to be taken consistently for the first week to decrease pain and promote adequate pain relief and participation in physical therapy.  ? After the initial surgical pain begins to resolve, you may begin to decrease the pain medication. By the end of 6 weeks, you should be off of pain medications.  ? Refills will not be given by the office during evening hours, on weekends, or after 6 weeks post-op.  ? To seek refills on pain medications during the initial 6 week post-operative period, you must call the office 48 hours in advance to request the refill. The office will then notify you when to  the prescription. DO NOT wait until you are out of the medication to request a refill.     V. FOLLOW-UP VISITS:  · You will need to follow up in the office with your surgeon on November 18, 2020. Please call this number [phone]  to schedule this  appointment.  If you have any concerns or suspected complications prior to your follow up visit, please call your surgeons office. Do not wait until your appointment time if you suspect complications. These will need to be addressed in the office promptly.

## 2020-10-24 NOTE — THERAPY EVALUATION
Patient Name: Christian Hurtado  : 1950    MRN: [id number]                              Today's Date: 10/24/2020       Admit Date: 10/23/2020    Visit Dx:     ICD-10-CM ICD-9-CM   1. Primary osteoarthritis of right hip  M16.11 715.15   2. Status post right hip replacement  Z96.641 V43.64     Patient Active Problem List   Diagnosis   • Status post right hip replacement     Past Medical History:   Diagnosis Date   • Arthritis    • Cancer (CMS/HCC)     PROSTATE   • Hip pain    • Hypertension      Past Surgical History:   Procedure Laterality Date   • ANKLE SURGERY     • COLON SURGERY      SIGMOIDECTOMY-PERFORATION   • COLONOSCOPY     • WRIST SURGERY Right      General Information     Row Name 10/24/20 0907          OT Time and Intention    Document Type  evaluation  -RB     Mode of Treatment  individual therapy  -RB     Row Name 10/24/20 0907          General Information    Patient Profile Reviewed  yes  -RB     Prior Level of Function  independent:;ADL's;transfer  -RB     Existing Precautions/Restrictions  fall;hip, anterior Increased time provided to discuss his precautions.  -RB     Barriers to Rehab  none identified  -RB     Row Name 10/24/20 0907          Living Environment    Lives With  spouse  -RB     Row Name 10/24/20 0907          Home Main Entrance    Number of Stairs, Main Entrance  six  -RB     Stair Railings, Main Entrance  railings not in good condition, need repair for safe use  -RB     Row Name 10/24/20 0907          Cognition    Orientation Status (Cognition)  oriented x 4  -RB     Row Name 10/24/20 0907          Safety Issues, Functional Mobility    Impairments Affecting Function (Mobility)  range of motion (ROM);pain;strength  -RB       User Key  (r) = Recorded By, (t) = Taken By, (c) = Cosigned By    Initials Name Provider Type    RB Supriya Petersen OT Occupational Therapist        Mobility/ADL's     Row Name 10/24/20 0908          Bed Mobility    Bed Mobility  bed mobility (all)  activities  -RB     All Activities, [place] (Bed Mobility)  modified independence  -RB     Bed Mobility, Safety Issues  decreased use of legs for bridging/pushing  -RB     Assistive Device (Bed Mobility)  bed rails  -RB     Row Name 10/24/20 0908          Transfers    Transfers  sit-stand transfer;bed-chair transfer  -RB     Bed-Chair [place] (Transfers)  modified independence  -RB     Assistive Device (Bed-Chair Transfers)  walker, front-wheeled  -RB     Sit-Stand [place] (Transfers)  modified independence  -RB     Row Name 10/24/20 0908          Sit-Stand Transfer    Assistive Device (Sit-Stand Transfers)  walker, front-wheeled  -RB     Row Name 10/24/20 0908          Functional Mobility    Functional Mobility- Ind. Level  conditional independence  -RB     Functional Mobility- Device  rolling walker  -RB     Functional Mobility- Comment  slow pacing due to 8/10 pain  -RB     Row Name 10/24/20 0908          Activities of Daily Living    BADL Assessment/Intervention  grooming;feeding  -RB     Row Name 10/24/20 0908          Grooming Assessment/Training    [place] Level (Grooming)  grooming skills;modified independence  -RB     Position (Grooming)  supported sitting  -RB     Row Name 10/24/20 0908          Self-Feeding Assessment/Training    [place] Level (Feeding)  feeding skills;modified independence  -RB     Position (Self-Feeding)  supported sitting  -RB       User Key  (r) = Recorded By, (t) = Taken By, (c) = Cosigned By    Initials Name Provider Type    RB Supriya Petersen OT Occupational Therapist        Obj/Interventions     Row Name 10/24/20 0909          Sensory Assessment (Somatosensory)    Sensory Assessment (Somatosensory)  sensation intact  -RB     Row Name 10/24/20 0909          Vision Assessment/Intervention    Visual Impairment/Limitations  WNL  -RB     Row Name 10/24/20 0909          Range of Motion Comprehensive    Comment, General Range of Motion  RLE impaired at hip per  surgical precautions  -RB     Row Name 10/24/20 0909          Strength Comprehensive (MMT)    Comment, General Manual Muscle Testing (MMT) Assessment  RLE weakness s/p hip surgery  -RB     Row Name 10/24/20 0909          Balance    Comment, Balance  No balance deficits seen when pt utilizing rolling walker  -RB       User Key  (r) = Recorded By, (t) = Taken By, (c) = Cosigned By    Initials Name Provider Type    uSpriya Ramos OT Occupational Therapist        Goals/Plan     Row Name 10/24/20 0911          Bed Mobility Goal 1 (OT)    Activity/Assistive Device (Bed Mobility Goal 1, OT)  bed mobility activities, all  -RB     [place] Level/Cues Needed (Bed Mobility Goal 1, OT)  modified independence  -RB     Time Frame (Bed Mobility Goal 1, OT)  short term goal (STG);2 weeks  -RB     Progress/Outcomes (Bed Mobility Goal 1, OT)  goal met  -RB     Row Name 10/24/20 0911          Transfer Goal 1 (OT)    Activity/Assistive Device (Transfer Goal 1, OT)  transfers, all  -RB     [place] Level/Cues Needed (Transfer Goal 1, OT)  modified independence  -RB     Time Frame (Transfer Goal 1, OT)  short term goal (STG);2 weeks  -RB     Progress/Outcome (Transfer Goal 1, OT)  goal met  -RB     Row Name 10/24/20 0911          Grooming Goal 1 (OT)    Activity/Device (Grooming Goal 1, OT)  grooming skills, all  -RB     [place] (Grooming Goal 1, OT)  modified independence  -RB     Time Frame (Grooming Goal 1, OT)  short term goal (STG);2 weeks  -RB     Progress/Outcome (Grooming Goal 1, OT)  goal met  -RB     Row Name 10/24/20 0911          Therapy Assessment/Plan (OT)    Planned Therapy Interventions (OT)  BADL retraining;transfer/mobility retraining;patient/caregiver education/training;occupation/activity based interventions  -RB       User Key  (r) = Recorded By, (t) = Taken By, (c) = Cosigned By    Initials Name Provider Type    Supriya Ramos OT Occupational Therapist        Clinical Impression     Row  Name 10/24/20 0910          Pain Scale: Numbers Pre/Post-Treatment    Pretreatment Pain Rating  4/10  -RB     Posttreatment Pain Rating  8/10  -RB     Pain Location - Side  Right  -RB     Pain Location  hip  -RB     Pain Intervention(s)  Repositioned;Rest;Elevated  -RB     Row Name 10/24/20 0910          Plan of Care Review    Plan of Care Reviewed With  patient  -RB     Progress  improving  -RB     Row Name 10/24/20 0910          Therapy Assessment/Plan (OT)    Rehab Potential (OT)  good, to achieve stated therapy goals  -RB     Criteria for Skilled Therapeutic Interventions Met (OT)  no Pt d/c home today with HH  -RB     Row Name 10/24/20 0910          Therapy Plan Review/Discharge Plan (OT)    Anticipated Discharge Disposition (OT)  home with assist;home with home health  -RB     Row Name 10/24/20 0910          Vital Signs    O2 Delivery Pre Treatment  room air  -RB     O2 Delivery Intra Treatment  room air  -RB     O2 Delivery Post Treatment  room air  -RB     Pre Patient Position  Sitting  -RB     Intra Patient Position  Standing  -RB     Post Patient Position  Supine  -RB     Row Name 10/24/20 0910          Positioning and Restraints    Pre-Treatment Position  sitting in chair/recliner  -RB     Post Treatment Position  bed  -RB     In Bed  notified nsg;supine;fowlers;call light within reach;with family/caregiver;patient within staff view;exit alarm on;encouraged to call for assist  -RB       User Key  (r) = Recorded By, (t) = Taken By, (c) = Cosigned By    Initials Name Provider Type    RB Supriya Petersen, ANGELA Occupational Therapist        Outcome Measures     Row Name 10/24/20 0912          How much help from another is currently needed...    Putting on and taking off regular lower body clothing?  3  -RB     Bathing (including washing, rinsing, and drying)  3  -RB     Toileting (which includes using toilet bed pan or urinal)  4  -RB     Putting on and taking off regular upper body clothing  4  -RB     Taking  care of personal grooming (such as brushing teeth)  4  -RB     Eating meals  4  -RB     AM-PAC 6 Clicks Score (OT)  22  -RB     Row Name 10/24/20 0912          Functional Assessment    Outcome Measure Options  AM-PAC 6 Clicks Daily Activity (OT)  -RB       User Key  (r) = Recorded By, (t) = Taken By, (c) = Cosigned By    Initials Name Provider Type    RB Supriya Petersen OT Occupational Therapist        Occupational Therapy Education                 Title: PT OT SLP Therapies (Done)     Topic: Occupational Therapy (Done)     Point: ADL training (Done)     Description:   Instruct learner(s) on proper safety adaptation and remediation techniques during self care or transfers.   Instruct in proper use of assistive devices.              Learning Progress Summary           Patient Acceptance, E,TB, VU by RB at 10/24/2020 0912    Comment: Detailed education provided on anterior hip precautions and carryover into ADL/IADL tasks                   Point: Home exercise program (Done)     Description:   Instruct learner(s) on appropriate technique for monitoring, assisting and/or progressing therapeutic exercises/activities.              Learning Progress Summary           Patient Acceptance, E,TB, VU by RB at 10/24/2020 0912    Comment: Detailed education provided on anterior hip precautions and carryover into ADL/IADL tasks                   Point: Precautions (Done)     Description:   Instruct learner(s) on prescribed precautions during self-care and functional transfers.              Learning Progress Summary           Patient Acceptance, E,TB, VU by RB at 10/24/2020 0912    Comment: Detailed education provided on anterior hip precautions and carryover into ADL/IADL tasks                   Point: Body mechanics (Done)     Description:   Instruct learner(s) on proper positioning and spine alignment during self-care, functional mobility activities and/or exercises.              Learning Progress Summary           Patient  Acceptance, E,TB, VU by RB at 10/24/2020 0912    Comment: Detailed education provided on anterior hip precautions and carryover into ADL/IADL tasks                               User Key     Initials Effective Dates Name Provider Type Discipline     04/02/20 -  Supriya Petersen OT Occupational Therapist OT              OT Recommendation and Plan  Planned Therapy Interventions (OT): BADL retraining, transfer/mobility retraining, patient/caregiver education/training, occupation/activity based interventions  Plan of Care Review  Plan of Care Reviewed With: patient  Progress: improving     Time Calculation:   Time Calculation- OT     Row Name 10/24/20 0914             Time Calculation- OT    OT Start Time  0846  -RB      OT Stop Time  0856  -RB      OT Time Calculation (min)  10 min  -      OT Received On  10/24/20  -        User Key  (r) = Recorded By, (t) = Taken By, (c) = Cosigned By    Initials Name Provider Type    RB Supriya Petersen OT Occupational Therapist        Therapy Charges for Today     Code Description Service Date Service Provider Modifiers Qty    83983782158 HC OT EVAL LOW COMPLEXITY 2 10/24/2020 Supriya Petersen OT GO 1               Supriya Petersen OT  10/24/2020

## 2020-10-24 NOTE — PROGRESS NOTES
"Daily progress note    Chief complaint  Doing better  No new complaints  Wants to go home    History of present illness  69-year-old white male with history of hypertension hyperlipidemia prostate cancer and severe osteoarthritis was electively admitted for right total hip arthroplasty secondary to disabling pain and failed outpatient treatment.  Patient underwent right total hip arthroplasty this morning without any complication and I am asked to follow the patient for medical problems.  Patient is a dictation and gave me a detailed history and at the time of interview hurting at the surgical site but no other complaint.  Patient denies any fever cough chest pain shortness of breath abdominal pain nausea vomiting diarrhea.    Review of systems  As above    Physical examination  Blood pressure 113/59, pulse 82, temperature 98.2 °F (36.8 °C), temperature source Skin, resp. rate 16, height 170.2 cm (67\"), weight 79.2 kg (174 lb 9.7 oz), SpO2 97 %.    HEENT unremarkable  Neck supple  Lungs clear bilaterally  Heart S1-S2  Abdomen soft nontender was positive  Extremities no edema  Neuro no focal deficit  Psych normal mood and behavior    LABS  Lab Results (last 24 hours)     Procedure Component Value Units Date/Time    BNP [[id number]]  (Normal) Collected: 10/24/20 0452    Specimen: Blood Updated: 10/24/20 0622     proBNP 53.1 pg/mL     Narrative:      Among patients with dyspnea, NT-proBNP is highly sensitive for the detection of acute congestive heart failure. In addition NT-proBNP of <300 pg/ml effectively rules out acute congestive heart failure with 99% negative predictive value.    Results may be falsely decreased if patient taking Biotin.      TSH [[id number]]  (Normal) Collected: 10/24/20 0452    Specimen: Blood Updated: 10/24/20 0622     TSH 0.532 uIU/mL     Comprehensive Metabolic Panel [[id number]]  (Abnormal) Collected: 10/24/20 0452    Specimen: Blood Updated: 10/24/20 0611     Glucose 107 mg/dL      BUN 28 " mg/dL      Creatinine 1.18 mg/dL      Sodium 133 mmol/L      Potassium 4.8 mmol/L      Chloride 103 mmol/L      CO2 20.4 mmol/L      Calcium 8.6 mg/dL      Total Protein 5.1 g/dL      Albumin 3.30 g/dL      ALT (SGPT) 17 U/L      AST (SGOT) 22 U/L      Alkaline Phosphatase 42 U/L      Total Bilirubin 0.5 mg/dL      eGFR Non African Amer 61 mL/min/1.73      Globulin 1.8 gm/dL      A/G Ratio 1.8 g/dL      BUN/Creatinine Ratio 23.7     Anion Gap 9.6 mmol/L     Narrative:      GFR Normal >60  Chronic Kidney Disease <60  Kidney Failure <15      Lipid Panel [[id number]]  (Abnormal) Collected: 10/24/20 0452    Specimen: Blood Updated: 10/24/20 0611     Total Cholesterol 108 mg/dL      Triglycerides 123 mg/dL      HDL Cholesterol 33 mg/dL      LDL Cholesterol  53 mg/dL      VLDL Cholesterol 22 mg/dL      LDL/HDL Ratio 1.53    Narrative:      Cholesterol Reference Ranges  (U.S. Department of Health and Human Services ATP III Classifications)    Desirable          <200 mg/dL  Borderline High    200-239 mg/dL  High Risk          >240 mg/dL      Triglyceride Reference Ranges  (U.S. Department of Health and Human Services ATP III Classifications)    Normal           <150 mg/dL  Borderline High  150-199 mg/dL  High             200-499 mg/dL  Very High        >500 mg/dL    HDL Reference Ranges  (U.S. Department of Health and Human Services ATP III Classifcations)    Low     <40 mg/dl (major risk factor for CHD)  High    >60 mg/dl ('negative' risk factor for CHD)        LDL Reference Ranges  (U.S. Department of Health and Human Services ATP III Classifcations)    Optimal          <100 mg/dL  Near Optimal     100-129 mg/dL  Borderline High  130-159 mg/dL  High             160-189 mg/dL  Very High        >189 mg/dL    Hemoglobin A1c [[id number]]  (Abnormal) Collected: 10/24/20 0452    Specimen: Blood Updated: 10/24/20 0556     Hemoglobin A1C 5.70 %     Narrative:      Hemoglobin A1C Ranges:    Increased Risk for Diabetes  5.7% to  6.4%  Diabetes                     >= 6.5%  Diabetic Goal                < 7.0%    CBC & Differential [[id number]]  (Abnormal) Collected: 10/24/20 0452    Specimen: Blood Updated: 10/24/20 0545    Narrative:      The following orders were created for panel order CBC & Differential.  Procedure                               Abnormality         Status                     ---------                               -----------         ------                     CBC Auto Differential[[id number]]        Abnormal            Final result                 Please view results for these tests on the individual orders.    CBC Auto Differential [[id number]]  (Abnormal) Collected: 10/24/20 0452    Specimen: Blood Updated: 10/24/20 0545     WBC 11.47 10*3/mm3      RBC 3.84 10*6/mm3      Hemoglobin 11.6 g/dL      Hematocrit 34.6 %      MCV 90.1 fL      MCH 30.2 pg      MCHC 33.5 g/dL      RDW 12.7 %      RDW-SD 42.3 fl      MPV 10.8 fL      Platelets 198 10*3/mm3      Neutrophil % 77.4 %      Lymphocyte % 11.3 %      Monocyte % 10.0 %      Eosinophil % 0.3 %      Basophil % 0.3 %      Immature Grans % 0.7 %      Neutrophils, Absolute 8.87 10*3/mm3      Lymphocytes, Absolute 1.30 10*3/mm3      Monocytes, Absolute 1.15 10*3/mm3      Eosinophils, Absolute 0.04 10*3/mm3      Basophils, Absolute 0.03 10*3/mm3      Immature Grans, Absolute 0.08 10*3/mm3      nRBC 0.0 /100 WBC         Imaging Results (Last 24 Hours)     ** No results found for the last 24 hours. **          Current Facility-Administered Medications:   •  aspirin EC tablet 81 mg, 81 mg, Oral, Q12H, Pia Morrison MD, 81 mg at 10/24/20 0936  •  bisacodyl (DULCOLAX) EC tablet 10 mg, 10 mg, Oral, Daily PRN, Pia Morrison MD  •  cetirizine (zyrTEC) tablet 10 mg, 10 mg, Oral, Daily, Pia Morrison MD, 10 mg at 10/24/20 0937  •  docusate sodium (COLACE) capsule 100 mg, 100 mg, Oral, BID, Pia Morrison MD, 100 mg at 10/24/20 0936  •   HYDROcodone-acetaminophen (NORCO) 7.5-325 MG per tablet 1 tablet, 1 tablet, Oral, Q4H PRN, Pia Morrison MD, 1 tablet at 10/23/20 2209  •  HYDROmorphone (DILAUDID) injection 0.5 mg, 0.5 mg, Intravenous, Q2H PRN **AND** naloxone (NARCAN) injection 0.1 mg, 0.1 mg, Intravenous, Q5 Min PRN, Pia Morrison MD  •  lisinopril (PRINIVIL,ZESTRIL) tablet 20 mg, 20 mg, Oral, BID, Pia Morrison MD, 20 mg at 10/24/20 0940  •  melatonin tablet 1 mg, 1 mg, Oral, Nightly PRN, Pia Morrison MD  •  ondansetron (ZOFRAN) tablet 4 mg, 4 mg, Oral, Q6H PRN **OR** ondansetron (ZOFRAN) injection 4 mg, 4 mg, Intravenous, Q6H PRN, Pia Morrison MD  •  pantoprazole (PROTONIX) EC tablet 40 mg, 40 mg, Oral, Q AM, Charlotte Weathers MD  •  sodium chloride 0.9 % infusion, 75 mL/hr, Intravenous, Continuous, Charlotte Weathers MD, Last Rate: 75 mL/hr at 10/23/20 1545, 75 mL/hr at 10/23/20 1545     ASSESSMENT  Right total hip arthroplasty  Severe osteoarthritis  Hypertension  Hyperlipidemia  Gastroesophageal reflux disease    PLAN  CPM  Postop care  Home medications  Medically stable  Stress ulcer DVT prophylaxis  PT OT  Supportive care  Discussed with nursing staff  Okay to discharge on current medications with close follow-up with primary care doctor    CHARLOTTE WEATHERS MD

## 2020-10-24 NOTE — DISCHARGE SUMMARY
Orthopedic Discharge Summary      Patient: Christian Hurtado    YOB: 1950    Medical Record Number: [id number]    Attending Physician: Pia Morrison,*    Consulting Physician(s):   Consulting Physician(s)     Provider Relationship Specialty    Lauro Weathers MD Consulting Physician Internal Medicine          Date of Admission: 10/23/2020  5:29 AM  Date of Discharge:     Admitting Diagnosis: Primary osteoarthritis of right hip [M16.11]  Primary osteoarthritis of right hip [M16.11]    Procedure(s):  TOTAL HIP ARTHROPLASTY ANTERIOR WITH HANA TABLE    Patient Active Problem List   Diagnosis   • Status post right hip replacement          Allergies   Allergen Reactions   • Penicillins Other (See Comments)     REACTION AS CHILD          Discharge Medications      New Medications      Instructions Start Date   aspirin 81 MG EC tablet   81 mg, Oral, Every 12 Hours Scheduled      bisacodyl 5 MG EC tablet  Commonly known as: DULCOLAX   10 mg, Oral, Daily PRN      docusate sodium 100 MG capsule   100 mg, Oral, 2 Times Daily      HYDROcodone-acetaminophen 7.5-325 MG per tablet  Commonly known as: NORCO   1 tablet, Oral, Every 4 Hours PRN      ondansetron 4 MG tablet  Commonly known as: ZOFRAN   4 mg, Oral, Every 6 Hours PRN         Continue These Medications      Instructions Start Date   acetaminophen 500 MG tablet  Commonly known as: TYLENOL   1,000 mg, Oral, Every 6 Hours PRN      atorvastatin 10 MG tablet  Commonly known as: LIPITOR   10 mg, Oral, Daily      lisinopril 20 MG tablet  Commonly known as: PRINIVIL,ZESTRIL   20 mg, Oral, 2 times daily      loratadine 10 MG tablet  Commonly known as: CLARITIN   10 mg, Oral, Daily      meloxicam 15 MG tablet  Commonly known as: MOBIC   15 mg, Oral, Daily      multivitamin tablet tablet  Commonly known as: THERAGRAN   1 tablet, Oral, Daily                  Past Medical History:   Diagnosis Date   • Arthritis    • Cancer (CMS/HCC)     PROSTATE   • Hip pain     • Hypertension         Past Surgical History:   Procedure Laterality Date   • ANKLE SURGERY     • COLON SURGERY      SIGMOIDECTOMY-PERFORATION   • COLONOSCOPY     • WRIST SURGERY Right         Social History     Occupational History   • Not on file   Tobacco Use   • Smoking status: Never Smoker   • Smokeless tobacco: Never Used   Substance and Sexual Activity   • Alcohol use: Never     Frequency: Never   • Drug use: Yes     Types: Marijuana     Comment: OCCAS   • Sexual activity: Defer      Social History     Social History Narrative   • Not on file        Family History   Problem Relation Age of Onset   • Malig Hyperthermia Neg Hx        Physical Exam: 69 y.o. male  General Appearance:    Alert, cooperative, in no acute distress                      Vitals:    10/23/20 1846 10/23/20 2152 10/24/20 0249 10/24/20 0657   BP: 121/65 100/63 93/47  Comment: left arm 94/53 101/53   BP Location: Right arm Left arm Right arm Right arm   Patient Position: Lying Lying Lying Lying   Pulse: 88 78 71 66   Resp: 16 16 16 16   Temp: 97.2 °F (36.2 °C) 97.1 °F (36.2 °C) 97.8 °F (36.6 °C) 98.2 °F (36.8 °C)   TempSrc: Skin Skin Skin Skin   SpO2: 98% 96% 98% 97%   Weight:       Height:            Hospital Course:  69 y.o. male admitted to [facility] to services of Pia Morrison,[place] with Primary osteoarthritis of right hip [M16.11]  Primary osteoarthritis of right hip [M16.11] on 10/23/2020 and underwent a RIGHT total hip arthroplasty Per MARY LOU Blake. Antibiotic  Kefzol  every 8 hours and VTE prophylaxis  Aspirin  orally  were per SCIP protocols. Post-operatively the patient transferred to the post-operative floor where the patient underwent mobilization therapy that included active ROM exercises. Opioids were titrated to achieve appropriate pain management to allow for participation in mobilization exercises. Vital signs are now stable. The incision is intact without signs or symptoms of infection. Operative  extremity neurovascular status remains intact.     Appropriate education re: incision care, activity levels, medications, hip dislocation precautions, and follow up visits was completed and all questions were answered.     The patient is now deemed stable for discharge.    DISCHARGE DISPOSITION AND PLAN:  The  Patient is being discharged home with home health for PT   2-3 X per week for 2-3 weeks and nursing care as needed.     DIAGNOSTIC TESTS:     Admission on 10/23/2020   Component Date Value Ref Range Status   • Glucose 10/24/2020 107* 65 - 99 mg/dL Final   • BUN 10/24/2020 28* 8 - 23 mg/dL Final   • Creatinine 10/24/2020 1.18  0.76 - 1.27 mg/dL Final   • Sodium 10/24/2020 133* 136 - 145 mmol/L Final   • Potassium 10/24/2020 4.8  3.5 - 5.2 mmol/L Final   • Chloride 10/24/2020 103  98 - 107 mmol/L Final   • CO2 10/24/2020 20.4* 22.0 - 29.0 mmol/L Final   • Calcium 10/24/2020 8.6  8.6 - 10.5 mg/dL Final   • Total Protein 10/24/2020 5.1* 6.0 - 8.5 g/dL Final   • Albumin 10/24/2020 3.30* 3.50 - 5.20 g/dL Final   • ALT (SGPT) 10/24/2020 17  1 - 41 U/L Final   • AST (SGOT) 10/24/2020 22  1 - 40 U/L Final   • Alkaline Phosphatase 10/24/2020 42  39 - 117 U/L Final   • Total Bilirubin 10/24/2020 0.5  0.0 - 1.2 mg/dL Final   • eGFR Non  Amer 10/24/2020 61  >60 mL/min/1.73 Final   • Globulin 10/24/2020 1.8  gm/dL Final   • A/G Ratio 10/24/2020 1.8  g/dL Final   • BUN/Creatinine Ratio 10/24/2020 23.7  7.0 - 25.0 Final   • Anion Gap 10/24/2020 9.6  5.0 - 15.0 mmol/L Final   • proBNP 10/24/2020 53.1  0.0 - 900.0 pg/mL Final   • TSH 10/24/2020 0.532  0.270 - 4.200 uIU/mL Final   • Total Cholesterol 10/24/2020 108  0 - 200 mg/dL Final   • Triglycerides 10/24/2020 123  0 - 150 mg/dL Final   • HDL Cholesterol 10/24/2020 33* 40 - 60 mg/dL Final   • LDL Cholesterol  10/24/2020 53  0 - 100 mg/dL Final   • VLDL Cholesterol 10/24/2020 22  5 - 40 mg/dL Final   • LDL/HDL Ratio 10/24/2020 1.53   Final   • Hemoglobin A1C 10/24/2020  5.70* 4.80 - 5.60 % Final   • WBC 10/24/2020 11.47* 3.40 - 10.80 10*3/mm3 Final   • RBC 10/24/2020 3.84* 4.14 - 5.80 10*6/mm3 Final   • Hemoglobin 10/24/2020 11.6* 13.0 - 17.7 g/dL Final   • Hematocrit 10/24/2020 34.6* 37.5 - 51.0 % Final   • MCV 10/24/2020 90.1  79.0 - 97.0 fL Final   • MCH 10/24/2020 30.2  26.6 - 33.0 pg Final   • MCHC 10/24/2020 33.5  31.5 - 35.7 g/dL Final   • RDW 10/24/2020 12.7  12.3 - 15.4 % Final   • RDW-SD 10/24/2020 42.3  37.0 - 54.0 fl Final   • MPV 10/24/2020 10.8  6.0 - 12.0 fL Final   • Platelets 10/24/2020 198  140 - 450 10*3/mm3 Final   • Neutrophil % 10/24/2020 77.4* 42.7 - 76.0 % Final   • Lymphocyte % 10/24/2020 11.3* 19.6 - 45.3 % Final   • Monocyte % 10/24/2020 10.0  5.0 - 12.0 % Final   • Eosinophil % 10/24/2020 0.3  0.3 - 6.2 % Final   • Basophil % 10/24/2020 0.3  0.0 - 1.5 % Final   • Immature Grans % 10/24/2020 0.7* 0.0 - 0.5 % Final   • Neutrophils, Absolute 10/24/2020 8.87* 1.70 - 7.00 10*3/mm3 Final   • Lymphocytes, Absolute 10/24/2020 1.30  0.70 - 3.10 10*3/mm3 Final   • Monocytes, Absolute 10/24/2020 1.15* 0.10 - 0.90 10*3/mm3 Final   • Eosinophils, Absolute 10/24/2020 0.04  0.00 - 0.40 10*3/mm3 Final   • Basophils, Absolute 10/24/2020 0.03  0.00 - 0.20 10*3/mm3 Final   • Immature Grans, Absolute 10/24/2020 0.08* 0.00 - 0.05 10*3/mm3 Final   • nRBC 10/24/2020 0.0  0.0 - 0.2 /100 WBC Final     No results found for: URICACID  No results found for: CRYSTAL  Microbiology Results (last 10 days)     Procedure Component Value - Date/Time    COVID PRE-OP / PRE-PROCEDURE SCREENING ORDER (NO ISOLATION) - Swab, Nasopharynx [[id number]] Collected: 10/21/20 1210    Lab Status: Final result Specimen: Swab from Nasopharynx Updated: 10/22/20 1156    Narrative:      The following orders were created for panel order COVID PRE-OP / PRE-PROCEDURE SCREENING ORDER (NO ISOLATION) - Swab, Nasopharynx.  Procedure                               Abnormality         Status                      ---------                               -----------         ------                     COVID-19,BIOTAP, NP/OP S...[[id number]]                      Final result                 Please view results for these tests on the individual orders.    COVID-19,BIOTAP, NP/OP SWAB IN TRANSPORT MEDIA OR SALINE 24-36 HR TAT - Swab, Nasopharynx [[id number]] Collected: 10/21/20 1210    Lab Status: Final result Specimen: Swab from Nasopharynx Updated: 10/22/20 1156     SARS-CoV-2 PCR Not Detected     Comment: Nucleic acid specific to SARS-CoV-2 (COVID-19) virus was not detected inthis sample by the TaqPath (TM) COVID-19 Combo Kit.SARS-CoV-2 (COVID-19) nucleic acid testing performed using [employer] Aptima (R) SARS-CoV-2 Assay or [employer] TaqPath   (TM)COVID-19 Combo Kit as indicated above under Emergency Use Authorization (EUA) from the FDA. Aptima (R) and TaqPath (TM) test performancecharacteristics verified by [facility] in accordance with the FDAs Guidance Document (Policy for Diagnostic   Tests for Coronavirus Disease-2019during the Public Health Emergency) issued on March 16, 2020. The laboratory is regulated under CLIA as qualified to perform high-complexity testingUnless otherwise noted, all testing was performed at [facility],   CLIA No. [id number], [place] State Licensee No. [id number]           Xr Chest Pa & Lateral    Result Date: 10/16/2020  1. Osteoarthritis of the right hip. There is also osteoarthritis of the left hip to a lesser degree. 2. No evidence for active disease in the chest.  This report was finalized on 10/16/2020 12:02 PM by Dr. Jerry Johnson M.D.      Xr Hip With Or Without Pelvis 1 View Right    Result Date: 10/23/2020  Postoperative changes from recent right total hip arthroplasty without findings of immediate complication.  This report was finalized on 10/23/2020 9:27 AM by Dr. Ken Schaefer M.D.      Xr Hip With Or Without Pelvis 2 - 3 View Right    Result Date: 10/16/2020  1.  Osteoarthritis of the right hip. There is also osteoarthritis of the left hip to a lesser degree. 2. No evidence for active disease in the chest.  This report was finalized on 10/16/2020 12:02 PM by Dr. Jerry Johnson M.D.          Follow-up Appointments  No future appointments.      Discharge and Follow up Instructions:     I. ACTIVITIES:  1. Exercises:  ? Complete exercise program as taught by the hospital physical therapist 2 times per day  ? Exercise program will be advanced by the physical therapist  ? During the day be up ambulating every 2 hours (while awake) for short distances  ? Complete the ankle pump exercises at least 10 times per hour (while awake)  ? Elevate legs when in bed and for at least 30 minutes during the day.Use cold packs 20-30 minutes approximately 5 times per day. This should be done before and after completing your exercises and at any time you are experiencing pain/ stiffness in your operative extremity.      2. Activities of Daily Living:  ? No tub baths, hot tubs, or swimming pools for 4 weeks  ? May shower and let water run over the incision on post-operative day #5 if no drainage. Do not scrub or rub the incision. Simply let the water run over the incision and pat dry.    II. Restrictions  ? Continue Anterior hip precautions as taught at the hospital  ? Your surgeon will discuss with you when you will be able to drive again. Usual guidelines are you are to be off pain medications prior to driving.  ? Weight bearing is as tolerated  ? First week stay inside on even terrain. May go up and down stairs one stair at a time utilizing the hand rail once cleared by physical therapy to do so.  ? After one week, you may venture outside (if cleared to do so by physical therapist).    III. Precautions:  ? Everyone that comes near you should wash their hands  ? No elective dental, genital-urinary, or colon procedures or surgical procedures for 12 weeks after surgery unless absolutely  necessary.  ?  If dental work or surgical procedure is deemed absolutely necessary, you will need to contact your surgeon as you will need to take antibiotics 1 hour prior to any dental work (including teeth cleanings).  ? Please discuss with your surgeon prophylactic antibiotics as the length of time this intervention will be necessary for you varies with each patient’s health history and situation.  ? Avoid sick people. If you must be around someone who is ill, they should wear a mask.  ? Avoid visits to the Emergency Room or Urgent Care. If you feel you need to go to the Emergency Room, please notify your Surgeon's office.  ? Stockings are to be worn for one week after surgery and are to be placed on in the morning and removed at night. Observe your skin when stocking is removed for any problems. Monitor the stockings to ensure that any swelling is not causing the stockings to become too tight. In this case, remove stockings immediately.      IV. INCISION CARE:  ? Wash your hands prior to dressing changes  ? Change the dressing as needed to keep incision clean and dry. Utilize dry gauze and paper tape. Avoid touching the side of the gauze that goes against the incision with your hands.  ? No creams or ointments to the incision  ? May remove dressing once the incision is free of drainage  ? Do not touch or pick at the incision  ? Check incision every day and notify surgeon immediately if any of the following signs or symptoms are noted:  o Increase in redness  o Increase in swelling around the incision and of the entire extremity  o Increase in pain  o Drainage oozing from the incision  o Pulling apart of the edges of the incision  o Increase in overall body temperature (greater than 100.5 degrees)    ?  You have absorbable sutures with steristrips, please do not remove the  steristrips for 14 days, you can shower on them 6 days after surgery.       V. Medications:   1. Anticoagulants: You will be discharged on an  anticoagulant. This is a prophylactic medication that helps prevent blood clots during your post-operative period.  You will be on Aspirin  81 mg twice daily for 30 days. If you were on Aspirin 81 mg prior to surgery you can go back to home dose once the 30 days are completed.     ? While taking the anticoagulant, you should avoid taking any additional aspirin, ibuprofen (Advil or Motrin), Aleve (Naprosyn) or other non-steroidal anti-inflammatory medications.   ? Notify surgeon immediately if any austyn bleeding is noted in the urine, stool, emesis, or from the nose or the incision. Blood in the stool will often appear as black rather than red. Blood in urine may appear as pink. Blood in emesis may appear as brown/black like coffee grounds.  ? You will need to apply pressure for longer periods of time to any cuts or abrasions to stop bleeding  ? Avoid alcohol while taking anticoagulants    2. Stool Softeners: You will be at greater risk of constipation after surgery due to being less mobile and the pain medications.   ? Take stool softeners as instructed by your surgeon while on pain medications. Over the counter Colace 100 mg 1-2 capsules twice daily.   ? If stools become too loose or too frequent, please decreases the dosage or stop the stool softener.  ? If constipation occurs despite use of stool softeners, you are to continue the stool softeners and add a laxative (Milk of Magnesia 1 ounce daily as needed).  ? Dulcolax oral tabs or suppository, or a fleets enema can also be utilized for constipation and can be obtained over the counter.   ? If above interventions are unsuccessful in inducing bowel movements, please contact your surgeon's office / family physician's office.  ? Drink plenty of fluids, and eat fruits and vegetables during your recovery time    3. Pain Medications utilized after surgery are narcotics and the law requires that the following information be given to all patients that are prescribed  narcotics:  ? CLASSIFICATION: Pain medications are called Opioids and are narcotics  ? LEGALITIES: It is illegal to share narcotics with others and to drive within 24 hours of taking narcotics  ? POTENTIAL SIDE EFFECTS: Potential side effects of opioids include: nausea, vomiting, itching, dizziness, drowsiness, dry mouth, constipation, and difficulty urinating.  ? POTENTIAL ADVERSE EFFECTS:   o Opioid tolerance can develop with use of pain medications and this simply means that it requires more and more of the medication to control pain; however, this is seen more in patients that use opioids for longer periods of time.  o Opioid dependence can develop with use of Opioids and this simply means that to stop the medication can cause withdrawal symptoms; however, this is seen with patients that use Opioids for longer periods of time.  o Opioid addiction can develop with use of Opioids and the incidence of this is very unlikely in patients who take the medications as ordered and stop the medications as instructed.  o Opioid overdose can be dangerous, but is unlikely when the medication is taken as ordered and stopped when ordered. It is important not to mix opioids with alcohol or with and type of sedative such as Benadryl as this can lead to over sedation and respiratory difficulty.  ? DOSAGE:   o Pain medications will need to be taken consistently for the first week to decrease pain and promote adequate pain relief and participation in physical therapy.  o After the initial surgical pain begins to resolve, you may begin to decrease the pain medication. By the end of 6 weeks, you should be off of pain medications.  o Refills will not be given by the office during evening hours, on weekends, or after 6 weeks post-op.  o To seek refills on pain medications during the initial 6 week post-operative period, you must call the office 48 hours in advance to request the refill. The office will then notify you when to   the prescription. DO NOT wait until you are out of the medication to request a refill.    V. FOLLOW-UP VISITS:  ? You will need to follow up in the office with your surgeon on November 18, 2020. Please call this number [phone]  to schedule this appointment.  If you have any concerns or suspected complications prior to your follow up visit, please call your surgeons office. Do not wait until your appointment time if you suspect complications. These will need to be addressed in the office promptly.    Date: 10/24/2020    MARY LOU Blake    CC: Provider, No Known; MARY LOU Blake Madhusudhan R,*

## 2020-10-24 NOTE — PLAN OF CARE
Goal Outcome Evaluation:  Plan of Care Reviewed With: patient  Progress: improving   PT POD1 right anterior total hip, VSS, afebrile, pain controlled with po pain medication, dressing c/d/I, All DC instrructions reviewed with pt all questions answered, IV DC'd, pt taken via WC with belongings and DC paperwork to exit to DC with spouse.

## 2020-10-24 NOTE — PROGRESS NOTES
Patient: Christian Hurtado  YOB: 1950     Date of Admission: 10/23/2020  5:29 AM Medical Record Number: [id number]     Attending Physician: Pia Morrison,*    Procedure(s):  TOTAL HIP ARTHROPLASTY ANTERIOR WITH HANA TABLE Post Operative Day Number: 1    Subjective : No new orthopaedic complaints     Pain Relief: some relief with present medication.     Systemic Complaints: No Complaints  Vitals:    10/23/20 1500 10/23/20 1846 10/23/20 2152 10/24/20 0249   BP: 123/76 121/65 100/63 93/47  Comment: left arm 94/53   BP Location: Right arm Right arm Left arm Right arm   Patient Position: Sitting Lying Lying Lying   Pulse: 68 88 78 71   Resp: 16 16 16 16   Temp: 96.9 °F (36.1 °C) 97.2 °F (36.2 °C) 97.1 °F (36.2 °C) 97.8 °F (36.6 °C)   TempSrc: Skin Skin Skin Skin   SpO2: 97% 98% 96% 98%   Weight:       Height:           Physical Exam: 69 y.o. male    General Appearance:       Alert, cooperative, in no acute distress                  Extremities:    Dressing Clean, Dry and Intact         Incision healthy without signs or symptoms of infections         No clinical sign of DVT        Able to do good movements of digits    Pulses:   Pulses palpable and equal bilaterally           Diagnostic Tests:     Results from last 7 days   Lab Units 10/24/20  0452   WBC 10*3/mm3 11.47*   HEMOGLOBIN g/dL 11.6*   HEMATOCRIT % 34.6*   PLATELETS 10*3/mm3 198     Results from last 7 days   Lab Units 10/24/20  0452   SODIUM mmol/L 133*   POTASSIUM mmol/L 4.8   CHLORIDE mmol/L 103   CO2 mmol/L 20.4*   BUN mg/dL 28*   CREATININE mg/dL 1.18   GLUCOSE mg/dL 107*   CALCIUM mg/dL 8.6         No results found for: CRP  No results found for: SEDRATE  No results found for: URICACID  No results found for: CRYSTAL  Microbiology Results (last 10 days)     Procedure Component Value - Date/Time    COVID PRE-OP / PRE-PROCEDURE SCREENING ORDER (NO ISOLATION) - Swab, Nasopharynx [[id number]] Collected: 10/21/20 1210    Lab Status:  Final result Specimen: Swab from Nasopharynx Updated: 10/22/20 1156    Narrative:      The following orders were created for panel order COVID PRE-OP / PRE-PROCEDURE SCREENING ORDER (NO ISOLATION) - Swab, Nasopharynx.  Procedure                               Abnormality         Status                     ---------                               -----------         ------                     COVID-19,BIOTAP, NP/OP S...[[id number]]                      Final result                 Please view results for these tests on the individual orders.    COVID-19,BIOTAP, NP/OP SWAB IN TRANSPORT MEDIA OR SALINE 24-36 HR TAT - Swab, Nasopharynx [[id number]] Collected: 10/21/20 1210    Lab Status: Final result Specimen: Swab from Nasopharynx Updated: 10/22/20 1156     SARS-CoV-2 PCR Not Detected     Comment: Nucleic acid specific to SARS-CoV-2 (COVID-19) virus was not detected inthis sample by the TaqPath (TM) COVID-19 Combo Kit.SARS-CoV-2 (COVID-19) nucleic acid testing performed using [employer] Aptima (R) SARS-CoV-2 Assay or [employer] TaqPath   (TM)COVID-19 Combo Kit as indicated above under Emergency Use Authorization (EUA) from the FDA. Aptima (R) and TaqPath (TM) test performancecharacteristics verified by [facility] in accordance with the FDAs Guidance Document (Policy for Diagnostic   Tests for Coronavirus Disease-2019during the Public Health Emergency) issued on March 16, 2020. The laboratory is regulated under CLIA as qualified to perform high-complexity testingUnless otherwise noted, all testing was performed at [facility],   CLIA No. [id number], [place] State Licensee No. [id number]           Xr Chest Pa & Lateral    Result Date: 10/16/2020  1. Osteoarthritis of the right hip. There is also osteoarthritis of the left hip to a lesser degree. 2. No evidence for active disease in the chest.  This report was finalized on 10/16/2020 12:02 PM by Dr. Jerry Johnson M.D.      Xr Hip With Or Without Pelvis 1 View  Right    Result Date: 10/23/2020  Postoperative changes from recent right total hip arthroplasty without findings of immediate complication.  This report was finalized on 10/23/2020 9:27 AM by Dr. Ken Schaefer M.D.      Xr Hip With Or Without Pelvis 2 - 3 View Right    Result Date: 10/16/2020  1. Osteoarthritis of the right hip. There is also osteoarthritis of the left hip to a lesser degree. 2. No evidence for active disease in the chest.  This report was finalized on 10/16/2020 12:02 PM by Dr. Jerry Johnson M.D.              Current Medications:  Scheduled Meds:aspirin, 81 mg, Oral, Q12H  cetirizine, 10 mg, Oral, Daily  docusate sodium, 100 mg, Oral, BID  lisinopril, 20 mg, Oral, BID  pantoprazole, 40 mg, Oral, Q AM      Continuous Infusions:sodium chloride, 75 mL/hr, Last Rate: 75 mL/hr (10/23/20 1545)      PRN Meds:.bisacodyl  •  HYDROcodone-acetaminophen  •  HYDROcodone-acetaminophen  •  HYDROmorphone **AND** naloxone  •  melatonin  •  ondansetron **OR** ondansetron    Assessment:    Procedure(s):  TOTAL HIP ARTHROPLASTY ANTERIOR WITH HANA TABLE    Patient Active Problem List   Diagnosis   • Status post right hip replacement       PLAN:   Continues current post-op course  Anticoagulation: Aspirin started  Dressing Change PRN  Mobilize with PT as tolerated per protocol    Weight Bearing: WBAT  Discharge Plan: OK to plan for discharge in  today to home and home health  from orthopadic perspective.      Mary Jo Crespo, APRN    Date: 10/24/2020    Time: 06:28 EDT

## 2020-10-24 NOTE — PLAN OF CARE
Goal Outcome Evaluation:  Plan of Care Reviewed With: patient  Progress: improving  Outcome Summary: 69/M POD-0 R RANDALL.  VSS. A&Ox4.  Pt ambulates w/assist x1 and walker.  BRP.  Voiding function is intact.  Pain controlled w/ po pain meds.  D/c plan is home w/ home health on 10/24/20.

## 2020-10-24 NOTE — PLAN OF CARE
Pt presents to [facility] 2/2 to right hip replacement. Pt previously independent and very active. Pt completing ADL's and mobility in his hospital room with Mod I. No LOB or safety concerns. Pt with plans to d/c home today with HH therapy.        Patient was wearing a face mask during this therapy encounter. Therapist used appropriate personal protective equipment including mask, goggles and gloves. Mask used was standard procedure mask. Appropriate PPE was worn during the entire therapy session. Hand hygiene was completed before and after therapy session. Patient is not in enhanced droplet precautions.

## 2020-10-26 NOTE — PROGRESS NOTES
Case Management Discharge Note      Final Note: Pt d/c home with family support & Dimitris .         Selected Continued Care - Discharged on 10/24/2020 Admission date: 10/23/2020 - Discharge disposition: Home-Health Care Svc    Destination    No services have been selected for the patient.              Durable Medical Equipment    No services have been selected for the patient.              Dialysis/Infusion    No services have been selected for the patient.              Home Medical Care Coordination complete    Service Provider Selected Services Address Phone Fax    [facility] [place]  [facility] [address] [address] [phone]          Therapy    No services have been selected for the patient.              Community Resources    No services have been selected for the patient.                       Final Discharge Disposition Code: 06 - home with home health care

## 2021-03-09 DIAGNOSIS — Z23 IMMUNIZATION DUE: ICD-10-CM

## (undated) DEVICE — SYR CONTRL LUERLOK 10CC

## (undated) DEVICE — SHEET, DRAPE, SPLIT, STERILE: Brand: MEDLINE

## (undated) DEVICE — 3M™ STERI-STRIP™ REINFORCED ADHESIVE SKIN CLOSURES, R1547, 1/2 IN X 4 IN (12 MM X 100 MM), 6 STRIPS/ENVELOPE: Brand: 3M™ STERI-STRIP™

## (undated) DEVICE — DRSNG WND GEL FIBR OPTICELL AG PLS W/SLV LF 4X5IN  STRL

## (undated) DEVICE — GLV SURG BIOGEL LTX PF 8

## (undated) DEVICE — OPTIFOAM GENTLE SA, POSTOP, 4X8: Brand: MEDLINE

## (undated) DEVICE — ANTIBACTERIAL UNDYED BRAIDED (POLYGLACTIN 910), SYNTHETIC ABSORBABLE SUTURE: Brand: COATED VICRYL

## (undated) DEVICE — DRP C/ARM 41X74IN

## (undated) DEVICE — PK ANT HIP 40

## (undated) DEVICE — GLV SURG PREMIERPRO ORTHO LTX PF SZ8 BRN

## (undated) DEVICE — GLV SURG SIGNATURE ESSENTIAL PF LTX SZ8

## (undated) DEVICE — MAT FLR ABSORBENT LG 4FT 10 2.5FT

## (undated) DEVICE — SOL ISO/ALC RUB 70PCT 4OZ

## (undated) DEVICE — 1010 S-DRAPE TOWEL DRAPE 10/BX: Brand: STERI-DRAPE™

## (undated) DEVICE — TRAP FLD MINIVAC MEGADYNE 100ML

## (undated) DEVICE — SKIN PREP TRAY W/CHG: Brand: MEDLINE INDUSTRIES, INC.

## (undated) DEVICE — SUT VIC 3/0 CTI 36IN J944H

## (undated) DEVICE — APPL CHLORAPREP HI/LITE 26ML ORNG

## (undated) DEVICE — SUT MNCRYL 3/0 PS2 18IN MCP497G

## (undated) DEVICE — PENCL E/S ULTRAVAC TELESCP NOSE HOLSTR 10FT

## (undated) DEVICE — DECANT BG O JET